# Patient Record
Sex: MALE | Race: WHITE | Employment: FULL TIME | URBAN - METROPOLITAN AREA
[De-identification: names, ages, dates, MRNs, and addresses within clinical notes are randomized per-mention and may not be internally consistent; named-entity substitution may affect disease eponyms.]

---

## 2018-08-01 NOTE — PROGRESS NOTES
SUBJECTIVE:   Adrian Wagner is a 57 year old male who presents to clinic today for the following health issues:      Physical   Annual:     Getting at least 3 servings of Calcium per day:  NO    Bi-annual eye exam:  NO    Dental care twice a year:  NO    Sleep apnea or symptoms of sleep apnea:  None    Diet:  Diabetic    Frequency of exercise:  4-5 days/week    Duration of exercise:  N/A    Taking medications regularly:  Yes    Medication side effects:  None    Additional concerns today:  No    Diabetes Follow-up      Patient is checking blood sugars: not at all    Diabetic concerns: None and other - left foot pain, numbness and tingling     Symptoms of hypoglycemia (low blood sugar): none     Paresthesias (numbness or burning in feet) or sores: Yes left foot     Date of last diabetic eye exam: 2 years    He has experienced increase difficulty reading from a far.      He saw eye doctor last and had no diabetic changes but needed glasses.     He only eats 2 meals per day    Doesn't remember to check blood sugars regularly, last time he checked it was 140.     He is only on one insulin now, previously on two.  Continues on Metformin    Uses the Gabapentin at night, he doesn't have severe neuropathy and actually pain in left foot is likely from past fracture that causes him persistent pain.  He did complete therapy with ID for his osteomyelitis in his right foot. The foot seems to be doing well.     He does admit he likes donuts and sugar. He eats a bag of Fritos at work for snack but admits he used to eat candy bar as well but cut that out.  Drinks only Diet Pepsi, otherwise water.     Chews tobacco, trying to quit, unsuccessful with Chantix in past, has never used nicotine products.     He has been out of his Diabetes medication for the last 3 days.     Diabetes Management Resources    Hyperlipidemia Follow-Up      Rate your low fat/cholesterol diet?: good    Taking statin?  No    Other lipid  medications/supplements?:  none    Hypertension Follow-up      Outpatient blood pressures are not being checked.    Low Salt Diet: low salt    BP Readings from Last 2 Encounters:   08/03/18 (!) 172/98   12/02/13 118/80     Hemoglobin A1C (%)   Date Value   11/20/2013 13.8 (H)   09/12/2007 7.6 (H)     LDL Cholesterol Calculated (mg/dL)   Date Value   09/12/2007 81   08/18/2006 79     Problem list and histories reviewed & adjusted, as indicated.  Additional history: as documented      Patient Active Problem List   Diagnosis     Tobacco use disorder     Hyperlipidemia LDL goal <100     Hypertension goal BP (blood pressure) < 140/90     Contracture of ankle and foot joint     History of kidney stones     Osteomyelitis of right foot (H)     Hyperpigmented skin lesion     History of foot fracture     Type 2 diabetes mellitus with hyperglycemia, with long-term current use of insulin (H)     Type 2 diabetes mellitus with diabetic polyneuropathy, with long-term current use of insulin (H)     Past Surgical History:   Procedure Laterality Date     AMPUTATION Right 05/09/2017    right foot partial amputation     C REMOVAL OF KIDNEY STONE  04/2005       Social History   Substance Use Topics     Smoking status: Never Smoker     Smokeless tobacco: Current User     Types: Snuff, Chew     Alcohol use Yes      Comment: occassionally, weekend     Family History   Problem Relation Age of Onset     Asthma Paternal Grandfather      C.A.D. Paternal Grandfather 55     several paternal uncles     HEART DISEASE Paternal Grandfather      several  paternal uncles also.     Respiratory Paternal Grandfather      asthma     Diabetes Paternal Grandmother      Hypertension Brother      Cancer Maternal Grandmother      unknown type of cancer     Alzheimer Disease Mother      passed away at age 68     Cardiovascular Father 73     cva     Cerebrovascular Disease Father      Breast Cancer No family hx of      Cancer - colorectal No family hx of       Prostate Cancer No family hx of      Anesthesia Reaction No family hx of      Arthritis No family hx of      Blood Disease No family hx of      Lipids No family hx of      Musculoskeletal Disorder No family hx of      Neurologic Disorder No family hx of      Osteoperosis No family hx of      Thyroid Disease No family hx of          Current Outpatient Prescriptions   Medication Sig Dispense Refill     Blood Glucose Monitoring Suppl (FIFTY50 GLUCOSE METER 2.0) w/Device KIT Dispense meter, test strips, lancets covered by pt ins. E11.65 NIDDM type II, uncontrolled - Test 3 times/day, Reason: Unstable diabetes       gabapentin (NEURONTIN) 300 MG capsule Take 1 capsule (300 mg) by mouth At Bedtime 90 capsule 1     insulin glargine (LANTUS SOLOSTAR) 100 UNIT/ML pen Inject 15 Units Subcutaneous every morning 3 mL 1     lisinopril (PRINIVIL/ZESTRIL) 20 MG tablet Take 1 tablet (20 mg) by mouth daily 90 tablet 1     lisinopril-hydrochlorothiazide (PRINZIDE/ZESTORETIC) 20-25 MG per tablet Take 1 tablet by mouth daily 90 tablet 1     metFORMIN (GLUCOPHAGE) 1000 MG tablet Take 1 tablet (1,000 mg) by mouth 2 times daily (with meals) 180 tablet 1     rosuvastatin (CRESTOR) 20 MG tablet Take 1 tablet (20 mg) by mouth At Bedtime 90 tablet 1     ASPIRIN 81 MG OR TABS 1 tablet daily (Patient not taking: No sig reported) 30 12     GARLIC 1 tabalet every day for sinus issues       Multiple Vitamins-Minerals (MULTI-VITAMIN/MINERALS) TABS Take 1 tablet by mouth       [DISCONTINUED] insulin glargine (LANTUS SOLOSTAR) 100 UNIT/ML pen Inject 15 Units Subcutaneous every morning       [DISCONTINUED] LANTUS SOLOSTAR SOLN 100 UNIT/ML 50 units bid. Inject subcutaneous. 1 Month 0     [DISCONTINUED] lisinopril (PRINIVIL,ZESTRIL) 30 MG tablet Take by mouth daily       [DISCONTINUED] lisinopril-hydrochlorothiazide (PRINZIDE/ZESTORETIC) 20-25 MG per tablet        [DISCONTINUED] metFORMIN (GLUCOPHAGE) 1000 MG tablet Take 1,000 mg by mouth 2 times daily  "(with meals)       [DISCONTINUED] metFORMIN (GLUCOPHAGE-XR) 500 MG 24 hr tablet Take 1 tablet (500 mg) by mouth 2 times daily (with meals) (Patient not taking: Reported on 8/3/2018) 60 tablet 3     [DISCONTINUED] rosuvastatin (CRESTOR) 20 MG tablet Take 20 mg by mouth       Allergies   Allergen Reactions     Slo-Niacin [Niacin] Hives     BP Readings from Last 3 Encounters:   08/03/18 (!) 172/98   12/02/13 118/80   11/20/13 128/80    Wt Readings from Last 3 Encounters:   08/03/18 221 lb (100.2 kg)   12/02/13 241 lb (109.3 kg)   11/20/13 240 lb 12.8 oz (109.2 kg)                  Labs reviewed in EPIC    ROS:  Constitutional, HEENT, cardiovascular, pulmonary, GI, , musculoskeletal, neuro, skin, endocrine and psych systems are negative, except as otherwise noted.    Answers for HPI/ROS submitted by the patient on 8/3/2018   abdominal pain: No  Blood in stool: No  Blood in urine: No  chest pain: No  chills: No  congestion: No  constipation: No  cough: No  diarrhea: No  dizziness: No  ear pain: No  eye pain: No  nervous/anxious: No  fever: No  frequency: No  genital sores: No  headaches: No  hearing loss: No  heartburn: No  arthralgias: Yes  joint swelling: No  peripheral edema: No  mood changes: No  myalgias: No  nausea: No  dysuria: No  palpitations: No  Skin sensation changes: No  sore throat: No  urgency: No  rash: No  shortness of breath: No  visual disturbance: Yes  weakness: No  impotence: Yes  penile discharge: No    OBJECTIVE:     BP (!) 172/98  Pulse 68  Temp 96.7  F (35.9  C)  Resp 16  Ht 5' 10.47\" (1.79 m)  Wt 221 lb (100.2 kg)  SpO2 98%  BMI 31.29 kg/m2  Body mass index is 31.29 kg/(m^2).  GENERAL: healthy, alert and no distress  EYES: Eyes grossly normal to inspection, PERRL and conjunctivae and sclerae normal  HENT: ear canals and TM's normal, nose and mouth without ulcers or lesions  NECK: no adenopathy, no asymmetry, masses, or scars and thyroid normal to palpation  RESP: lungs clear to " auscultation - no rales, rhonchi or wheezes  CV: regular rate and rhythm, normal S1 S2, no S3 or S4, no murmur, click or rub, no peripheral edema and peripheral pulses strong  ABDOMEN: soft, nontender, no hepatosplenomegaly, no masses and bowel sounds normal   (male): normal male genitalia without lesions or urethral discharge, no hernia  RECTAL: normal sphincter tone, no rectal masses, prostate normal size, smooth, nontender without nodules or masses  MS: no gross musculoskeletal defects noted, no edema  SKIN: Left side mid back there is a larger circular hyperpigmented, multi-colored lesion.   NEURO: Normal strength and tone, mentation intact and speech normal  PSYCH: mentation appears normal, affect normal/bright  Diabetic Foot Screen:  Any complaints of increased pain or numbness ? No  Is there a foot ulcer now or a history of foot ulcer? No  Does the foot have an abnormal shape?  YES - Post-surg right foot.  Are the nails thick, too long or ingrown? No  Are there any redness or open areas? No         Sensation Testing done at all points on the diagram with monofilament     Right Foot: Sensation Absent at the following points , 1, 2, 3, 4, 5 and 6  Left Foot: Sensation Absent at the following points , 1, 2, 3, 4, 5 and 6     Risk Category: 1- Loss of protective sensation with normal appearing foot (no weakness, deformity, callous, pre-ulcer or h/o ulceration)  Performed by Dorie Potter PA-C     Diagnostic Test Results:  Results for orders placed or performed in visit on 08/03/18 (from the past 24 hour(s))   HEMOGLOBIN A1C   Result Value Ref Range    Hemoglobin A1C 10.2 (H) 0 - 5.6 %       ASSESSMENT/PLAN:       ICD-10-CM    1. Annual physical exam Z00.00    2. Type 2 diabetes mellitus with hyperglycemia, with long-term current use of insulin (H) E11.65 HEMOGLOBIN A1C    Z79.4 Albumin Random Urine Quantitative with Creat Ratio     TSH WITH FREE T4 REFLEX     OPTOMETRY REFERRAL     Comprehensive metabolic  panel     metFORMIN (GLUCOPHAGE) 1000 MG tablet     insulin glargine (LANTUS SOLOSTAR) 100 UNIT/ML pen   3. Type 2 diabetes mellitus with diabetic polyneuropathy, with long-term current use of insulin (H) E11.42 HEMOGLOBIN A1C    Z79.4 Albumin Random Urine Quantitative with Creat Ratio     TSH WITH FREE T4 REFLEX     Comprehensive metabolic panel     metFORMIN (GLUCOPHAGE) 1000 MG tablet     insulin glargine (LANTUS SOLOSTAR) 100 UNIT/ML pen     gabapentin (NEURONTIN) 300 MG capsule   4. Hyperlipidemia LDL goal <100 E78.5 Lipid panel reflex to direct LDL Fasting     Comprehensive metabolic panel     rosuvastatin (CRESTOR) 20 MG tablet   5. Hypertension goal BP (blood pressure) < 140/90 I10 Comprehensive metabolic panel     lisinopril-hydrochlorothiazide (PRINZIDE/ZESTORETIC) 20-25 MG per tablet     lisinopril (PRINIVIL/ZESTRIL) 20 MG tablet     **Basic metabolic panel FUTURE 14d   6. Tobacco use disorder F17.200 TOBACCO CESSATION ORDER FOR    7. Hyperpigmented skin lesion L81.9    8. Need for prophylactic vaccination against Streptococcus pneumoniae (pneumococcus) Z23 PPSV23, IM/SUBQ (2+ YRS) - [id number]   9. Screening for diabetic peripheral neuropathy Z13.89 FOOT EXAM  NO CHARGE [58312.114]   10. Need for hepatitis C screening test Z11.59 Hepatitis C Screen Reflex to HCV RNA Quant and Genotype   11. Screening for HIV (human immunodeficiency virus) Z11.4 HIV Screening   12. Screen for colon cancer Z12.11 GASTROENTEROLOGY ADULT REF PROCEDURE ONLY   13. History of foot fracture Z87.81 ORTHO  REFERRAL       1. We will call with labs and make appropriate adjustments/recommendations as it pertains to his other chronic conditions    2/3: His A1c is elevated from his last numbers at his primary at [facility].  Last A1c at [facility] was 8.6 on 11/28/2017.  His A1c is now 10.2.  His microalbumin is worse and his blood sugar reading is in the 300's.  He is metformin max dose and will continue.  He reports only  Using the Lantus and not mealtime insulin.  He is only using 15 units.  At this time recommend that he increase this to 20 units once daily.  Checking blood sugars 3 times per day.  Monitoring for any lows.  Encourage him to bring in monitor at next visit to see reading results.  May or may not require mealtime insulin in the future (patient only eats 2 meals per day).  He doesn't feel he needs diabetes education at this time.     4: Recommend he restart his Crestor that he doesn't ever recall starting even if he has a normal LDL due to his diabetes.  Monitor for side effects. His Triglycerides are elevated, work on diet, restart Crestor, re-check in 3 months.     5. Hypertension: BP not well controlled, patient currently on medication.  Advised diet and exercise recommendations and increased Lisinopril to 40 mg total per day, continue with hydrochlorothiazide at 25 mg once daily.  Will re-check BMP and BP within 2 weeks with nurse/lab only visits.     6. He is working to quit slowly.  He chews but has never smoked.  He has failed chantix but discussed this doesn't generally help as much as it does for those who smoke.  Recommend use of nicotine replacement.  He is ok for now, will call if he needs these sent to pharmacy.     7. There is an abnormal lesion on back, will remove at follow-up visit.      Updated his immunizations, he declines influenza because he gets ill but is ok with Pneumovax.  Ordered Colonoscopy and he is going to work with his son to set up a date that will work for him to get that done.      His foot pain from past fracture still bothers him and recommended he see Podiatry.  Referral placed today.     Follow-up in 1 month, sooner if needed.      Options for treatment and follow-up care were reviewed with the patient and/or guardian. Patient and/or guardian engaged in the decision making process and verbalized understanding of the options discussed and agreed with the final plan.     Dorie JJ  LUH Potter  [facility]    Answers for HPI/ROS submitted by the patient on 8/3/2018   abdominal pain: No  Blood in stool: No  Blood in urine: No  chest pain: No  chills: No  congestion: No  constipation: No  cough: No  diarrhea: No  dizziness: No  ear pain: No  eye pain: No  nervous/anxious: No  fever: No  frequency: No  genital sores: No  headaches: No  hearing loss: No  heartburn: No  arthralgias: Yes  joint swelling: No  peripheral edema: No  mood changes: No  myalgias: No  nausea: No  dysuria: No  palpitations: No  Skin sensation changes: No  sore throat: No  urgency: No  rash: No  shortness of breath: No  visual disturbance: Yes  weakness: No  impotence: Yes  penile discharge: No  PHQ-2 Score: 0

## 2018-08-01 NOTE — PATIENT INSTRUCTIONS
1. We will call with labs and make recommendations for your blood pressure, cholesterol and diabetes.  This will also determine your next Diabetes visit.     2. Set up with Nurse Only and Lab only visits to re-check your blood pressure and kidney function in 2 weeks.      3. Start taking Lisinopril 20 mg dose along with your previous blood pressure doses    4. Continue to watch carbs, sugars, salt, fat intake.     5. Start taking your statin (crestor) at bedtime for your cholesterol.     6. Set up with Eye Doctor, see number provided for [facility]     7. Set up with DR. Eid our Podiatrist regarding your left foot pain.     8. We should perform biopsy on that spot on back in future    9. Let me know if you want nicotine replacement products to help with chew.     Preventive Health Recommendations  Male Ages 50 - 64    Yearly exam:             See your health care provider every year in order to  o   Review health changes.   o   Discuss preventive care.    o   Review your medicines if your doctor has prescribed any.     Have a cholesterol test every 5 years, or more frequently if you are at risk for high cholesterol/heart disease.     Have a diabetes test (fasting glucose) every three years. If you are at risk for diabetes, you should have this test more often.     Have a colonoscopy at age 50, or have a yearly FIT test (stool test). These exams will check for colon cancer.      Talk with your health care provider about whether or not a prostate cancer screening test (PSA) is right for you.    You should be tested each year for STDs (sexually transmitted diseases), if you re at risk.     Shots: Get a flu shot each year. Get a tetanus shot every 10 years.     Nutrition:    Eat at least 5 servings of fruits and vegetables daily.     Eat whole-grain bread, whole-wheat pasta and brown rice instead of white grains and rice.     Get adequate Calcium and Vitamin D.     Lifestyle    Exercise for at least 150 minutes a week  (30 minutes a day, 5 days a week). This will help you control your weight and prevent disease.     Limit alcohol to one drink per day.     No smoking.     Wear sunscreen to prevent skin cancer.     See your dentist every six months for an exam and cleaning.     See your eye doctor every 1 to 2 years.

## 2018-08-03 ENCOUNTER — OFFICE VISIT (OUTPATIENT)
Dept: FAMILY MEDICINE | Facility: OTHER | Age: 58
End: 2018-08-03
Payer: COMMERCIAL

## 2018-08-03 VITALS
SYSTOLIC BLOOD PRESSURE: 172 MMHG | HEART RATE: 68 BPM | DIASTOLIC BLOOD PRESSURE: 98 MMHG | OXYGEN SATURATION: 98 % | RESPIRATION RATE: 16 BRPM | WEIGHT: 221 LBS | BODY MASS INDEX: 31.64 KG/M2 | HEIGHT: 70 IN | TEMPERATURE: 96.7 F

## 2018-08-03 DIAGNOSIS — L81.9 HYPERPIGMENTED SKIN LESION: ICD-10-CM

## 2018-08-03 DIAGNOSIS — Z23 NEED FOR PROPHYLACTIC VACCINATION AGAINST STREPTOCOCCUS PNEUMONIAE (PNEUMOCOCCUS): ICD-10-CM

## 2018-08-03 DIAGNOSIS — E11.42 TYPE 2 DIABETES MELLITUS WITH DIABETIC POLYNEUROPATHY, WITH LONG-TERM CURRENT USE OF INSULIN (H): ICD-10-CM

## 2018-08-03 DIAGNOSIS — Z79.4 TYPE 2 DIABETES MELLITUS WITH HYPERGLYCEMIA, WITH LONG-TERM CURRENT USE OF INSULIN (H): ICD-10-CM

## 2018-08-03 DIAGNOSIS — E11.65 TYPE 2 DIABETES MELLITUS WITH HYPERGLYCEMIA, WITH LONG-TERM CURRENT USE OF INSULIN (H): ICD-10-CM

## 2018-08-03 DIAGNOSIS — Z12.11 SCREEN FOR COLON CANCER: ICD-10-CM

## 2018-08-03 DIAGNOSIS — I10 HYPERTENSION GOAL BP (BLOOD PRESSURE) < 140/90: ICD-10-CM

## 2018-08-03 DIAGNOSIS — Z79.4 TYPE 2 DIABETES MELLITUS WITH DIABETIC POLYNEUROPATHY, WITH LONG-TERM CURRENT USE OF INSULIN (H): ICD-10-CM

## 2018-08-03 DIAGNOSIS — F17.200 TOBACCO USE DISORDER: ICD-10-CM

## 2018-08-03 DIAGNOSIS — Z11.4 SCREENING FOR HIV (HUMAN IMMUNODEFICIENCY VIRUS): ICD-10-CM

## 2018-08-03 DIAGNOSIS — Z87.81 HISTORY OF FOOT FRACTURE: ICD-10-CM

## 2018-08-03 DIAGNOSIS — Z11.59 NEED FOR HEPATITIS C SCREENING TEST: ICD-10-CM

## 2018-08-03 DIAGNOSIS — Z13.89 SCREENING FOR DIABETIC PERIPHERAL NEUROPATHY: ICD-10-CM

## 2018-08-03 DIAGNOSIS — Z00.00 ANNUAL PHYSICAL EXAM: Primary | ICD-10-CM

## 2018-08-03 DIAGNOSIS — E78.5 HYPERLIPIDEMIA LDL GOAL <100: ICD-10-CM

## 2018-08-03 PROBLEM — M24.573 CONTRACTURE OF ANKLE AND FOOT JOINT: Status: ACTIVE | Noted: 2018-08-03

## 2018-08-03 PROBLEM — M24.576 CONTRACTURE OF ANKLE AND FOOT JOINT: Status: ACTIVE | Noted: 2018-08-03

## 2018-08-03 PROBLEM — E11.40 DIABETIC NEUROPATHY (H): Status: ACTIVE | Noted: 2017-05-17

## 2018-08-03 PROBLEM — Z87.442 HISTORY OF KIDNEY STONES: Status: ACTIVE | Noted: 2017-05-01

## 2018-08-03 PROBLEM — E11.40 DIABETIC NEUROPATHY (H): Status: RESOLVED | Noted: 2017-05-17 | Resolved: 2018-08-03

## 2018-08-03 PROBLEM — M86.9 OSTEOMYELITIS OF RIGHT FOOT (H): Status: ACTIVE | Noted: 2017-05-01

## 2018-08-03 LAB
ALBUMIN SERPL-MCNC: 4.1 G/DL (ref 3.4–5)
ALP SERPL-CCNC: 61 U/L (ref 40–150)
ALT SERPL W P-5'-P-CCNC: 35 U/L (ref 0–70)
ANION GAP SERPL CALCULATED.3IONS-SCNC: 12 MMOL/L (ref 3–14)
AST SERPL W P-5'-P-CCNC: 20 U/L (ref 0–45)
BILIRUB SERPL-MCNC: 0.6 MG/DL (ref 0.2–1.3)
BUN SERPL-MCNC: 17 MG/DL (ref 7–30)
CALCIUM SERPL-MCNC: 9.3 MG/DL (ref 8.5–10.1)
CHLORIDE SERPL-SCNC: 96 MMOL/L (ref 94–109)
CHOLEST SERPL-MCNC: 185 MG/DL
CO2 SERPL-SCNC: 28 MMOL/L (ref 20–32)
CREAT SERPL-MCNC: 0.84 MG/DL (ref 0.66–1.25)
CREAT UR-MCNC: 17 MG/DL
GFR SERPL CREATININE-BSD FRML MDRD: >90 ML/MIN/1.7M2
GLUCOSE SERPL-MCNC: 374 MG/DL (ref 70–99)
HBA1C MFR BLD: 10.2 % (ref 0–5.6)
HDLC SERPL-MCNC: 34 MG/DL
LDLC SERPL CALC-MCNC: 84 MG/DL
MICROALBUMIN UR-MCNC: 288 MG/L
MICROALBUMIN/CREAT UR: 1734.94 MG/G CR (ref 0–17)
NONHDLC SERPL-MCNC: 151 MG/DL
POTASSIUM SERPL-SCNC: 3.4 MMOL/L (ref 3.4–5.3)
PROT SERPL-MCNC: 8 G/DL (ref 6.8–8.8)
SODIUM SERPL-SCNC: 136 MMOL/L (ref 133–144)
TRIGL SERPL-MCNC: 336 MG/DL
TSH SERPL DL<=0.005 MIU/L-ACNC: 2.4 MU/L (ref 0.4–4)

## 2018-08-03 PROCEDURE — 99207 C FOOT EXAM  NO CHARGE: CPT | Mod: 25 | Performed by: PHYSICIAN ASSISTANT

## 2018-08-03 PROCEDURE — 83036 HEMOGLOBIN GLYCOSYLATED A1C: CPT | Performed by: PHYSICIAN ASSISTANT

## 2018-08-03 PROCEDURE — 87389 HIV-1 AG W/HIV-1&-2 AB AG IA: CPT | Performed by: PHYSICIAN ASSISTANT

## 2018-08-03 PROCEDURE — 99386 PREV VISIT NEW AGE 40-64: CPT | Mod: 25 | Performed by: PHYSICIAN ASSISTANT

## 2018-08-03 PROCEDURE — 86803 HEPATITIS C AB TEST: CPT | Performed by: PHYSICIAN ASSISTANT

## 2018-08-03 PROCEDURE — 99214 OFFICE O/P EST MOD 30 MIN: CPT | Mod: 25 | Performed by: PHYSICIAN ASSISTANT

## 2018-08-03 PROCEDURE — 84443 ASSAY THYROID STIM HORMONE: CPT | Performed by: PHYSICIAN ASSISTANT

## 2018-08-03 PROCEDURE — 90732 PPSV23 VACC 2 YRS+ SUBQ/IM: CPT | Performed by: PHYSICIAN ASSISTANT

## 2018-08-03 PROCEDURE — 82043 UR ALBUMIN QUANTITATIVE: CPT | Performed by: PHYSICIAN ASSISTANT

## 2018-08-03 PROCEDURE — 36415 COLL VENOUS BLD VENIPUNCTURE: CPT | Performed by: PHYSICIAN ASSISTANT

## 2018-08-03 PROCEDURE — 80053 COMPREHEN METABOLIC PANEL: CPT | Performed by: PHYSICIAN ASSISTANT

## 2018-08-03 PROCEDURE — 80061 LIPID PANEL: CPT | Performed by: PHYSICIAN ASSISTANT

## 2018-08-03 PROCEDURE — 90471 IMMUNIZATION ADMIN: CPT | Performed by: PHYSICIAN ASSISTANT

## 2018-08-03 RX ORDER — ROSUVASTATIN CALCIUM 20 MG/1
20 TABLET, COATED ORAL
COMMUNITY
Start: 2017-11-28 | End: 2018-08-03

## 2018-08-03 RX ORDER — GABAPENTIN 300 MG/1
300 CAPSULE ORAL AT BEDTIME
Qty: 90 CAPSULE | Refills: 1 | Status: SHIPPED | OUTPATIENT
Start: 2018-08-03 | End: 2019-01-16

## 2018-08-03 RX ORDER — LISINOPRIL AND HYDROCHLOROTHIAZIDE 20; 25 MG/1; MG/1
TABLET ORAL
COMMUNITY
Start: 2018-07-22 | End: 2018-08-03

## 2018-08-03 RX ORDER — INSULIN PUMP SYRINGE, 3 ML
EACH MISCELLANEOUS
COMMUNITY
Start: 2016-12-13

## 2018-08-03 RX ORDER — MULTIVIT-MIN/IRON FUM/FOLIC AC 7.5 MG-4
1 TABLET ORAL
COMMUNITY

## 2018-08-03 RX ORDER — ROSUVASTATIN CALCIUM 20 MG/1
20 TABLET, COATED ORAL AT BEDTIME
Qty: 90 TABLET | Refills: 1 | Status: SHIPPED | OUTPATIENT
Start: 2018-08-03 | End: 2019-01-16

## 2018-08-03 RX ORDER — LISINOPRIL AND HYDROCHLOROTHIAZIDE 20; 25 MG/1; MG/1
1 TABLET ORAL DAILY
Qty: 90 TABLET | Refills: 1 | Status: SHIPPED | OUTPATIENT
Start: 2018-08-03 | End: 2019-01-10

## 2018-08-03 RX ORDER — LISINOPRIL 20 MG/1
20 TABLET ORAL DAILY
Qty: 90 TABLET | Refills: 1 | Status: SHIPPED | OUTPATIENT
Start: 2018-08-03 | End: 2019-01-16

## 2018-08-03 ASSESSMENT — ENCOUNTER SYMPTOMS
HEMATOCHEZIA: 0
DIARRHEA: 0
CHILLS: 0
HEMATURIA: 0
SHORTNESS OF BREATH: 0
NERVOUS/ANXIOUS: 0
WEAKNESS: 0
HEARTBURN: 0
HEADACHES: 0
ARTHRALGIAS: 1
FEVER: 0
FREQUENCY: 0
EYE PAIN: 0
DIZZINESS: 0
PALPITATIONS: 0
MYALGIAS: 0
COUGH: 0
JOINT SWELLING: 0
PARESTHESIAS: 0
CONSTIPATION: 0
SORE THROAT: 0
NAUSEA: 0
ABDOMINAL PAIN: 0
DYSURIA: 0

## 2018-08-03 ASSESSMENT — PAIN SCALES - GENERAL: PAINLEVEL: NO PAIN (0)

## 2018-08-03 NOTE — Clinical Note
Not sure if I should be adding on an office visit because of the diabetes management or not. He is also a new patient now I believe because it has been so long since we saw him last.

## 2018-08-03 NOTE — NURSING NOTE
Prior to injection verified patient identity using patient's name and date of birth.  Due to injection administration, patient instructed to remain in clinic for 15 minutes  afterwards, and to report any adverse reaction to me immediately.      Screening Questionnaire for Adult Immunization    Are you sick today?   No   Do you have allergies to medications, food, a vaccine component or latex?   No   Have you ever had a serious reaction after receiving a vaccination?   No   Do you have a long-term health problem with heart disease, lung disease, asthma, kidney disease, metabolic disease (e.g. diabetes), anemia, or other blood disorder?   No   Do you have cancer, leukemia, HIV/AIDS, or any other immune system problem?   No   In the past 3 months, have you taken medications that affect  your immune system, such as prednisone, other steroids, or anticancer drugs; drugs for the treatment of rheumatoid arthritis, Crohn s disease, or psoriasis; or have you had radiation treatments?   No   Have you had a seizure, or a brain or other nervous system problem?   No   During the past year, have you received a transfusion of blood or blood     products, or been given immune (gamma) globulin or antiviral drug?   No   For women: Are you pregnant or is there a chance you could become        pregnant during the next month?   No   Have you received any vaccinations in the past 4 weeks?   No     Immunization questionnaire answers were all negative.        Per orders of Dorie Potter PA-C, injection of Pneumococcal 23 given by Belgica Ray CMA. Patient instructed to remain in clinic for 15 minutes afterwards, and to report any adverse reaction to me immediately.       Screening performed by Belgica Ray on 8/3/2018 at 12:26 PM.

## 2018-08-03 NOTE — MR AVS SNAPSHOT
After Visit Summary   8/3/2018    Adrian Wagner    MRN: [id number]           Patient Information     Date Of Birth          1960        Visit Information        Provider Department      8/3/2018 10:40 AM Dorie Potter PA-C [facility]        Today's Diagnoses     Annual physical exam    -  1    Type 2 diabetes mellitus with hyperglycemia, with long-term current use of insulin (H)        Type 2 diabetes mellitus with diabetic polyneuropathy, with long-term current use of insulin (H)        Hyperlipidemia LDL goal <100        Hypertension goal BP (blood pressure) < 140/90        Tobacco use disorder        Hyperpigmented skin lesion        Need for prophylactic vaccination against Streptococcus pneumoniae (pneumococcus)        Screening for diabetic peripheral neuropathy        Need for hepatitis C screening test        Screening for HIV (human immunodeficiency virus)        Screen for colon cancer        History of foot fracture          Care Instructions    1. We will call with labs and make recommendations for your blood pressure, cholesterol and diabetes.  This will also determine your next Diabetes visit.     2. Set up with Nurse Only and Lab only visits to re-check your blood pressure and kidney function in 2 weeks.      3. Start taking Lisinopril 20 mg dose along with your previous blood pressure doses    4. Continue to watch carbs, sugars, salt, fat intake.     5. Start taking your statin (crestor) at bedtime for your cholesterol.     6. Set up with Eye Doctor, see number provided for [facility]     7. Set up with DR. Eid our Podiatrist regarding your left foot pain.     8. We should perform biopsy on that spot on back in future    9. Let me know if you want nicotine replacement products to help with chew.     Preventive Health Recommendations  Male Ages 50 - 64    Yearly exam:             See your health care provider every year in order to  o   Review health changes.   o    Discuss preventive care.    o   Review your medicines if your doctor has prescribed any.     Have a cholesterol test every 5 years, or more frequently if you are at risk for high cholesterol/heart disease.     Have a diabetes test (fasting glucose) every three years. If you are at risk for diabetes, you should have this test more often.     Have a colonoscopy at age 50, or have a yearly FIT test (stool test). These exams will check for colon cancer.      Talk with your health care provider about whether or not a prostate cancer screening test (PSA) is right for you.    You should be tested each year for STDs (sexually transmitted diseases), if you re at risk.     Shots: Get a flu shot each year. Get a tetanus shot every 10 years.     Nutrition:    Eat at least 5 servings of fruits and vegetables daily.     Eat whole-grain bread, whole-wheat pasta and brown rice instead of white grains and rice.     Get adequate Calcium and Vitamin D.     Lifestyle    Exercise for at least 150 minutes a week (30 minutes a day, 5 days a week). This will help you control your weight and prevent disease.     Limit alcohol to one drink per day.     No smoking.     Wear sunscreen to prevent skin cancer.     See your dentist every six months for an exam and cleaning.     See your eye doctor every 1 to 2 years.            Follow-ups after your visit        Additional Services     GASTROENTEROLOGY ADULT REF PROCEDURE ONLY       Last Lab Result: Creatinine (mg/dL)       Date                     Value                 11/20/2013               0.83             ----------  There is no height or weight on file to calculate BMI.     Needed:  No  Language:  English    Patient will be contacted to schedule procedure.     Please be aware that coverage of these services is subject to the terms and limitations of your health insurance plan.  Call member services at your health plan with any benefit or coverage questions.  Any procedures must  be performed at a [facility] facility OR coordinated by your clinic's referral office.    Please bring the following with you to your appointment:    (1) Any X-Rays, CTs or MRIs which have been performed.  Contact the facility where they were done to arrange for  prior to your scheduled appointment.    (2) List of current medications   (3) This referral request   (4) Any documents/labs given to you for this referral            OPTOMETRY REFERRAL       Your provider has referred you to:  [facility] [place] [phone]   [email or web address]    Please be aware that coverage of these services is subject to the terms and limitations of your health insurance plan.  Call member services at your health plan with any benefit or coverage questions.      Please bring the following to your appointment:  >>   Any x-rays, CTs or MRIs which have been performed.  Contact the facility where they were done to arrange for  prior to your scheduled appointment.  Any new CT, MRI or other procedures ordered by your specialist must be performed at a [facility] facility or coordinated by your clinic's referral office.    >>   List of current medications   >>   This referral request   >>   Any documents/labs given to you for this referral            ORTHO  REFERRAL       [facility] is referring you to the Orthopedic  Services at [facility].       The  Representative will assist you in the coordination of your Orthopedic and Musculoskeletal Care as prescribed by your physician.    The  Representative will call you within 1 business day to help schedule your appointment, or you may contact the  Representative at:    All areas ~ [phone]     Type of Referral : [facility]       Timeframe requested: Routine    Coverage of these services is subject to the terms and limitations of your  health insurance plan.  Please call member services at your health plan with any benefit or coverage questions.      If X-rays, CT or MRI's have been performed, please contact the facility where they were done to arrange for , prior to your scheduled appointment.  Please bring this referral request to your appointment and present it to your specialist.                  Follow-up notes from your care team     Return in about 2 weeks (around 8/17/2018) for BP Recheck.      Your next 10 appointments already scheduled     Aug 17, 2018 11:00 AM CDT   Nurse Only with NL LAURA TEAM B, [facility]    [address]  [facility] [address]   [phone]              Future tests that were ordered for you today     Open Future Orders        Priority Expected Expires Ordered    **Basic metabolic panel FUTURE 14d Routine 8/10/2018 8/17/2018 8/3/2018            Who to contact     If you have questions or need follow up information about today's clinic visit or your schedule please contact [facility] directly at [phone].  Normal or non-critical lab and imaging results will be communicated to you by MyChart, letter or phone within 4 business days after the clinic has received the results. If you do not hear from us within 7 days, please contact the clinic through MyChart or phone. If you have a critical or abnormal lab result, we will notify you by phone as soon as possible.  Submit refill requests through [employer] or call your pharmacy and they will forward the refill request to us. Please allow 3 business days for your refill to be completed.          Additional Information About Your Visit        Care EveryWhere ID     This is your Care EveryWhere ID. This could be used by other organizations to access your [facility] medical records  [id number]        Your Vitals Were     Pulse Temperature Respirations Height Pulse Oximetry BMI (Body Mass  "Index)    68 96.7  F (35.9  C) 16 5' 10.47\" (1.79 m) 98% 31.29 kg/m2       Blood Pressure from Last 3 Encounters:   08/03/18 (!) 172/98   12/02/13 118/80   11/20/13 128/80    Weight from Last 3 Encounters:   08/03/18 221 lb (100.2 kg)   12/02/13 241 lb (109.3 kg)   11/20/13 240 lb 12.8 oz (109.2 kg)              We Performed the Following     Albumin Random Urine Quantitative with Creat Ratio     Comprehensive metabolic panel     FOOT EXAM  NO CHARGE [[id number]]     GASTROENTEROLOGY ADULT REF PROCEDURE ONLY     HEMOGLOBIN A1C     Hepatitis C Screen Reflex to HCV RNA Quant and Genotype     HIV Screening     Lipid panel reflex to direct LDL Fasting     OPTOMETRY REFERRAL     ORTHO  REFERRAL     PPSV23, IM/SUBQ (2+ YRS) - [id number]     TOBACCO CESSATION ORDER FOR HM     TSH WITH FREE T4 REFLEX          Today's Medication Changes          These changes are accurate as of 8/3/18 11:59 PM.  If you have any questions, ask your nurse or doctor.               Start taking these medicines.        Dose/Directions    gabapentin 300 MG capsule   Commonly known as:  NEURONTIN   Used for:  Type 2 diabetes mellitus with diabetic polyneuropathy, with long-term current use of insulin (H)   Started by:  Dorie Potter PA-C        Dose:  300 mg   Take 1 capsule (300 mg) by mouth At Bedtime   Quantity:  90 capsule   Refills:  1       insulin glargine 100 UNIT/ML injection   Commonly known as:  LANTUS   Used for:  Type 2 diabetes mellitus with hyperglycemia, with long-term current use of insulin (H), Type 2 diabetes mellitus with diabetic polyneuropathy, with long-term current use of insulin (H)   Replaces:  LANTUS SOLOSTAR 100 UNIT/ML injection   Started by:  Dorie Potter PA-C        Dose:  20 Units   Inject 20 Units Subcutaneous every morning   Quantity:  3 mL   Refills:  1       insulin pen needle 31G X 5 MM   Commonly known as:  B-D U/F   Used for:  Type 2 diabetes mellitus with hyperglycemia, with long-term current use " of insulin (H)   Started by:  Dorie Potter PA-C        Use daily or as directed.   Quantity:  100 each   Refills:  prn         These medicines have changed or have updated prescriptions.        Dose/Directions    FIFTY50 GLUCOSE METER 2.0 w/Device Kit   This may have changed:  Another medication with the same name was removed. Continue taking this medication, and follow the directions you see here.   Changed by:  Dorie Potter PA-C        Dispense meter, test strips, lancets covered by pt ins. E11.65 NIDDM type II, uncontrolled - Test 3 times/day, Reason: Unstable diabetes   Refills:  0       lisinopril 20 MG tablet   Commonly known as:  PRINIVIL/ZESTRIL   This may have changed:    - medication strength  - how much to take   Used for:  Hypertension goal BP (blood pressure) < 140/90   Changed by:  Dorie Potter PA-C        Dose:  20 mg   Take 1 tablet (20 mg) by mouth daily   Quantity:  90 tablet   Refills:  1       lisinopril-hydrochlorothiazide 20-25 MG per tablet   Commonly known as:  PRINZIDE/ZESTORETIC   This may have changed:    - how much to take  - how to take this  - when to take this   Used for:  Hypertension goal BP (blood pressure) < 140/90   Changed by:  Dorie Potter PA-C        Dose:  1 tablet   Take 1 tablet by mouth daily   Quantity:  90 tablet   Refills:  1       metFORMIN 1000 MG tablet   Commonly known as:  GLUCOPHAGE   This may have changed:  Another medication with the same name was removed. Continue taking this medication, and follow the directions you see here.   Used for:  Type 2 diabetes mellitus with hyperglycemia, with long-term current use of insulin (H), Type 2 diabetes mellitus with diabetic polyneuropathy, with long-term current use of insulin (H)   Changed by:  Dorie Potter PA-C        Dose:  1000 mg   Take 1 tablet (1,000 mg) by mouth 2 times daily (with meals)   Quantity:  180 tablet   Refills:  1       rosuvastatin 20 MG tablet   Commonly known as:  CRESTOR   This may have  changed:  when to take this   Used for:  Hyperlipidemia LDL goal <100   Changed by:  Dorie Potter PA-C        Dose:  20 mg   Take 1 tablet (20 mg) by mouth At Bedtime   Quantity:  90 tablet   Refills:  1         Stop taking these medicines if you haven't already. Please contact your care team if you have questions.     blood glucose monitoring lancets   Stopped by:  Dorie Potter PA-C           blood glucose monitoring test strip   Commonly known as:  ACCU-CHEK BART   Stopped by:  Dorie Potter PA-C           hydrochlorothiazide 50 MG tablet   Commonly known as:  HYDRODIURIL   Stopped by:  Dorie Potter PA-C           LANTUS SOLOSTAR 100 UNIT/ML injection   Generic drug:  insulin glargine   Replaced by:  insulin glargine 100 UNIT/ML injection   Stopped by:  Dorie Potter PA-C           NOVOLOG FLEXPEN SC   Stopped by:  Dorie Potter PA-C           NovoLOG MIX 70/30 FLEXpen injection   Generic drug:  insulin aspart protamine-insulin aspart   Stopped by:  Dorie Potter PA-C                Where to get your medicines      These medications were sent to [facility] [address] [place] [address] [facility] [address]  [address] [facility] [address]     Phone:  [phone]     gabapentin 300 MG capsule    insulin pen needle 31G X 5 MM    lisinopril 20 MG tablet    lisinopril-hydrochlorothiazide 20-25 MG per tablet    metFORMIN 1000 MG tablet    rosuvastatin 20 MG tablet         Some of these will need a paper prescription and others can be bought over the counter.  Ask your nurse if you have questions.     You don't need a prescription for these medications     insulin glargine 100 UNIT/ML injection                Primary Care Provider Fax #    Physician No Ref-Primary [phone]       No address on file        Equal Access to Services     SELAM GUTIERREZ AH: Ronen Chino, julita gibson, allison kacullen hamilton, jay obrien.  So [facility] [phone].    ATENCIÓN: Si luz hunter, tiene a rincon disposición servicios gratuitos de asistencia lingüística. Vinnie bae [phone].    We comply with applicable federal civil rights laws and [place] laws. We do not discriminate on the basis of race, color, national origin, age, disability, sex, sexual orientation, or gender identity.            Thank you!     Thank you for choosing [facility]  for your care. Our goal is always to provide you with excellent care. Hearing back from our patients is one way we can continue to improve our services. Please take a few minutes to complete the written survey that you may receive in the mail after your visit with us. Thank you!             Your Updated Medication List - Protect others around you: Learn how to safely use, store and throw away your medicines at www.disposemymeds.org.          This list is accurate as of 8/3/18 11:59 PM.  Always use your most recent med list.                   Brand Name Dispense Instructions for use Diagnosis    aspirin 81 MG tablet     30    1 tablet daily        FIFTY50 GLUCOSE METER 2.0 w/Device Kit      Dispense meter, test strips, lancets covered by pt ins. E11.65 NIDDM type II, uncontrolled - Test 3 times/day, Reason: Unstable diabetes        gabapentin 300 MG capsule    NEURONTIN    90 capsule    Take 1 capsule (300 mg) by mouth At Bedtime    Type 2 diabetes mellitus with diabetic polyneuropathy, with long-term current use of insulin (H)       GARLIC      1 tabalet every day for sinus issues        insulin glargine 100 UNIT/ML injection    LANTUS    3 mL    Inject 20 Units Subcutaneous every morning    Type 2 diabetes mellitus with hyperglycemia, with long-term current use of insulin (H), Type 2 diabetes mellitus with diabetic polyneuropathy, with long-term current use of insulin (H)       insulin pen needle 31G X 5 MM    B-D U/F    100 each    Use daily or as directed.    Type 2 diabetes mellitus with  hyperglycemia, with long-term current use of insulin (H)       lisinopril 20 MG tablet    PRINIVIL/ZESTRIL    90 tablet    Take 1 tablet (20 mg) by mouth daily    Hypertension goal BP (blood pressure) < 140/90       lisinopril-hydrochlorothiazide 20-25 MG per tablet    PRINZIDE/ZESTORETIC    90 tablet    Take 1 tablet by mouth daily    Hypertension goal BP (blood pressure) < 140/90       metFORMIN 1000 MG tablet    GLUCOPHAGE    180 tablet    Take 1 tablet (1,000 mg) by mouth 2 times daily (with meals)    Type 2 diabetes mellitus with hyperglycemia, with long-term current use of insulin (H), Type 2 diabetes mellitus with diabetic polyneuropathy, with long-term current use of insulin (H)       MULTI-VITAMIN/MINERALS Tabs      Take 1 tablet by mouth        rosuvastatin 20 MG tablet    CRESTOR    90 tablet    Take 1 tablet (20 mg) by mouth At Bedtime    Hyperlipidemia LDL goal <100

## 2018-08-05 LAB — HCV AB SERPL QL IA: NONREACTIVE

## 2018-08-06 ENCOUNTER — TELEPHONE (OUTPATIENT)
Dept: FAMILY MEDICINE | Facility: OTHER | Age: 58
End: 2018-08-06

## 2018-08-06 LAB — HIV 1+2 AB+HIV1 P24 AG SERPL QL IA: NONREACTIVE

## 2018-08-06 NOTE — TELEPHONE ENCOUNTER
----- Message from Dorie Potter PA-C sent at 8/4/2018  3:16 PM CDT -----  Please call patient.  His labs are not looking the best.  Thyroid, kidney and liver function are normal.  His A1c went from 8 up to 10.2, his blood sugar is 374, his microalbumin protein in urine is worse at 1700 and his triglycerides are high.    1. He needs to increase his insulin to 20 units once daily, work harder on diet modifications.   2. Start the Crestor as discussed for his cholesterol, and work on diet - limiting carbs and fats.   3. He needs to check his blood sugars 3 times per day until his next visit.     Please schedule him for 1 month diabetes follow-up in clinic.  Bring his meter with or his readings written down with.    If he doesn't improve his blood sugar he may need to go back on two different insulins to get this better controlled.      Dorie Potter PA-C

## 2018-08-06 NOTE — TELEPHONE ENCOUNTER
Left message for patient to return call to clinic.  Please inform of message below.    Stephanie Ramos, [facility]  August 6, 2018

## 2018-08-08 NOTE — TELEPHONE ENCOUNTER
LM for patient to return call. Please transfer to the team and give all lab results below.     Skylar Castillo, RN, BSN

## 2018-09-14 ENCOUNTER — TELEPHONE (OUTPATIENT)
Dept: FAMILY MEDICINE | Facility: OTHER | Age: 58
End: 2018-09-14

## 2018-09-14 NOTE — TELEPHONE ENCOUNTER
You placed a referral to [facility] for a diabetic eye exam on 8/3/18.    It is unclear if the patient has scheduled yet, not finding a report showing they were seen.     Please forward to your team if further follow up is needed to see if they have made this appointment.      Thank you!   Cony/Referral Representative for [facility]

## 2018-09-19 ENCOUNTER — TELEPHONE (OUTPATIENT)
Dept: FAMILY MEDICINE | Facility: OTHER | Age: 58
End: 2018-09-19

## 2018-09-19 NOTE — LETTER
[address]   [facility] [address]  Phone: [phone]  September 26, 2018      Adrian Wagner  [address] [facility] [address]      Dear Adrian,    We care about your health and have reviewed your health plan including your medical conditions, medications, and lab results.  Based on this review, it is recommended that you follow up regarding the following health topic(s):  -High Blood Pressure    We recommend you take the following action(s):  -schedule a FREE FLOAT MA-ONLY BLOOD PRESSURE APPOINTMENT within the next 1-4 weeks.     Please call us at the [facility] - [phone] (or use [employer]) to address the above recommendations.     Thank you for trusting [facility] and we appreciate the opportunity to serve you.  We look forward to supporting your healthcare needs in the future.    Healthy Regards,    Your Health Care Team  [facility]

## 2018-09-19 NOTE — TELEPHONE ENCOUNTER
Summary:    Patient is due/failing the following:   BP CHECK    Action needed:   Patient needs nurse only appointment.    Type of outreach:    Phone, left message for patient to call back.     Questions for provider review:    None                                                                                                                                    Nadia Sales       Chart routed to Care Team .          Panel Management Review      Patient has the following on his problem list:     Diabetes    ASA: Passed    Last A1C  Lab Results   Component Value Date    A1C 10.2 08/03/2018    A1C 13.8 11/20/2013    A1C 7.6 09/12/2007    A1C 8.3 08/18/2006     A1C tested: FAILED    Last LDL:    Lab Results   Component Value Date    CHOL 185 08/03/2018     Lab Results   Component Value Date    HDL 34 08/03/2018     Lab Results   Component Value Date    LDL 84 08/03/2018     Lab Results   Component Value Date    TRIG 336 08/03/2018     Lab Results   Component Value Date    CHOLHDLRATIO 5.4 09/12/2007     Lab Results   Component Value Date    NHDL 151 08/03/2018       Is the patient on a Statin? YES             Is the patient on Aspirin? YES    Medications     HMG CoA Reductase Inhibitors    rosuvastatin (CRESTOR) 20 MG tablet    Salicylates    ASPIRIN 81 MG OR TABS          Last three blood pressure readings:  BP Readings from Last 3 Encounters:   08/03/18 (!) 172/98   12/02/13 118/80   11/20/13 128/80        Tobacco History:     History   Smoking Status     Never Smoker   Smokeless Tobacco     Current User     Types: Snuff, Chew         Hypertension   Last three blood pressure readings:  BP Readings from Last 3 Encounters:   08/03/18 (!) 172/98   12/02/13 118/80   11/20/13 128/80     Blood pressure: FAILED    HTN Guidelines:  Age 18-59 BP range:  Less than 140/90  Age 60-85 with Diabetes:  Less than 140/90  Age 60-85 without Diabetes:  less than 150/90      Composite cancer screening  Chart review shows that this  patient is due/due soon for the following Colonoscopy will schedule on own time.

## 2018-09-26 ENCOUNTER — TELEPHONE (OUTPATIENT)
Dept: FAMILY MEDICINE | Facility: OTHER | Age: 58
End: 2018-09-26

## 2018-09-26 NOTE — LETTER
[facility]  [address]   [facility] [address]  Phone: [phone]  September 28, 2018      Adrian Wagner  [address] [facility] [address]      Dear Adrian,    We care about your health and have reviewed your health plan including your medical conditions, medications, and lab results.  Based on this review, it is recommended that you follow up regarding the following health topic(s):  -High Blood Pressure    We recommend you take the following action(s):  -schedule a FREE FLOAT MA-ONLY BLOOD PRESSURE APPOINTMENT within the next 1-4 weeks.  -schedule a FOLLOWUP APPOINTMENT.     Please call us at the [facility] - [phone] (or use [employer]) to address the above recommendations.     Thank you for trusting [facility] and we appreciate the opportunity to serve you.  We look forward to supporting your healthcare needs in the future.    Healthy Regards,    Your Health Care Team  [facility]

## 2018-09-26 NOTE — TELEPHONE ENCOUNTER
Left message for patient to call back. Please see message below and help schedule appointment.  Mariaelena Miguel, CMA

## 2018-11-15 ENCOUNTER — TELEPHONE (OUTPATIENT)
Dept: FAMILY MEDICINE | Facility: OTHER | Age: 58
End: 2018-11-15

## 2018-11-15 NOTE — LETTER
[facility]  [address] [facility]   [facility] [address]  Phone: [phone]  November 16, 2018      Adrian Wagner  [address] [place] [id number]      Dear Adrian,    We care about your health and have reviewed your health plan including your medical conditions, medications, and lab results.  Based on this review, it is recommended that you follow up regarding the following health topic(s):  -Diabetes  -High Blood Pressure    We recommend you take the following action(s):  -schedule a FOLLOWUP OFFICE APPOINTMENT.  We will perform the following labs:  BMP (basic metabolic panel).  -schedule a COLONOSCOPY to look for colon cancer (due every 10 years or 5 years in higher risk situations.)  Colonoscopies can prevent 90-95% of colon cancer deaths.  Problem lesions can be removed before they ever become cancer.  If you do not wish to do a colonoscopy or cannot afford to do one at this time, there is another option called a Fecal Immunochemical Occult Blood Test (FIT) a take home stool sample kit.  It does not replace the colonoscopy for colorectal cancer screening, but it can detect hidden bleeding in the lower colon.  It does need to be repeated every year and if a positive result is obtained, you would be referred for a colonoscopy.  If you have completed either one of these tests at another facility, please have the records sent to our clinic for our records.     Please call us at the [facility] - [phone] (or use [employer]) to address the above recommendations.     Thank you for trusting [facility] and we appreciate the opportunity to serve you.  We look forward to supporting your healthcare needs in the future.    Healthy Regards,    Your Health Care Team  [facility]

## 2018-11-15 NOTE — TELEPHONE ENCOUNTER
Panel Management Review    Summary:    Patient is due/failing the following:   Flu, BMP, COLONOSCOPY and FOLLOW UP    Action needed:   Patient needs office visit for Diabetes/Hypertention and Patient needs non-fasting lab only appointment    Type of outreach:    Phone, left message for patient to call back.     Questions for provider review:    None                                                                                                                                    Idania Brandon CMA (AAMA)       Chart routed to Care Team .      Patient has the following on his problem list:     Diabetes    ASA: Passed    Last A1C  Lab Results   Component Value Date    A1C 10.2 08/03/2018    A1C 13.8 11/20/2013    A1C 7.6 09/12/2007    A1C 8.3 08/18/2006     A1C tested: FAILED    Last LDL:    Lab Results   Component Value Date    CHOL 185 08/03/2018     Lab Results   Component Value Date    HDL 34 08/03/2018     Lab Results   Component Value Date    LDL 84 08/03/2018     Lab Results   Component Value Date    TRIG 336 08/03/2018     Lab Results   Component Value Date    CHOLHDLRATIO 5.4 09/12/2007     Lab Results   Component Value Date    NHDL 151 08/03/2018       Is the patient on a Statin? YES             Is the patient on Aspirin? YES    Medications     HMG CoA Reductase Inhibitors    rosuvastatin (CRESTOR) 20 MG tablet    Salicylates    ASPIRIN 81 MG OR TABS          Last three blood pressure readings:  BP Readings from Last 3 Encounters:   08/03/18 (!) 172/98   12/02/13 118/80   11/20/13 128/80       Date of last diabetes office visit: 8/3/18     Tobacco History:     History   Smoking Status     Never Smoker   Smokeless Tobacco     Current User     Types: Snuff, Chew         Hypertension   Last three blood pressure readings:  BP Readings from Last 3 Encounters:   08/03/18 (!) 172/98   12/02/13 118/80   11/20/13 128/80     Blood pressure: FAILED    HTN Guidelines:  Age 18-59 BP range:  Less than 140/90  Age  60-85 with Diabetes:  Less than 140/90  Age 60-85 without Diabetes:  less than 150/90      Composite cancer screening  Chart review shows that this patient is due/due soon for the following Colonoscopy

## 2018-11-16 NOTE — TELEPHONE ENCOUNTER
LM for patient to return phone call to clinic about message below.  Letter sent to patient.  Idania Brandon CMA ([facility])

## 2018-12-13 DIAGNOSIS — I10 HYPERTENSION GOAL BP (BLOOD PRESSURE) < 140/90: ICD-10-CM

## 2018-12-13 RX ORDER — LISINOPRIL AND HYDROCHLOROTHIAZIDE 20; 25 MG/1; MG/1
1 TABLET ORAL DAILY
Qty: 90 TABLET | Refills: 1 | OUTPATIENT
Start: 2018-12-13

## 2018-12-13 NOTE — TELEPHONE ENCOUNTER
Prinzide:  Sent 8/3/18 with 6 month supply. Refill not appropriate at this time. Numerous outreaches have been made to contact patient - will close at this time.     Skylar Castillo, RN, BSN

## 2018-12-20 ENCOUNTER — TELEPHONE (OUTPATIENT)
Dept: FAMILY MEDICINE | Facility: OTHER | Age: 58
End: 2018-12-20

## 2018-12-20 NOTE — TELEPHONE ENCOUNTER
Panel Management Review    Summary:    Patient is due/failing the following:   BMP, COLONOSCOPY and FOLLOW UP    Action needed:   Patient needs office visit for Hypertention/Diabetes and needs non-fasting lab only appointment.  Patient needs referral/order: Colonoscopy.    Type of outreach:    Phone, left message for patient to call back.     Questions for provider review:    None                                                                                                                                    Idania Brandon CMA (AAMA)       Chart routed to Care Team .      Patient has the following on his problem list:     Diabetes    ASA: Passed    Last A1C  Lab Results   Component Value Date    A1C 10.2 08/03/2018    A1C 13.8 11/20/2013    A1C 7.6 09/12/2007    A1C 8.3 08/18/2006     A1C tested: FAILED    Last LDL:    Lab Results   Component Value Date    CHOL 185 08/03/2018     Lab Results   Component Value Date    HDL 34 08/03/2018     Lab Results   Component Value Date    LDL 84 08/03/2018     Lab Results   Component Value Date    TRIG 336 08/03/2018     Lab Results   Component Value Date    CHOLHDLRATIO 5.4 09/12/2007     Lab Results   Component Value Date    NHDL 151 08/03/2018       Is the patient on a Statin? YES             Is the patient on Aspirin? YES    Medications     HMG CoA Reductase Inhibitors    rosuvastatin (CRESTOR) 20 MG tablet    Salicylates    ASPIRIN 81 MG OR TABS          Last three blood pressure readings:  BP Readings from Last 3 Encounters:   08/03/18 (!) 172/98   12/02/13 118/80   11/20/13 128/80       Date of last diabetes office visit: 8/3/18     Tobacco History:     History   Smoking Status     Never Smoker   Smokeless Tobacco     Current User     Types: Snuff, Chew         Hypertension   Last three blood pressure readings:  BP Readings from Last 3 Encounters:   08/03/18 (!) 172/98   12/02/13 118/80   11/20/13 128/80     Blood pressure: FAILED    HTN Guidelines:  Age 18-59 BP  range:  Less than 140/90  Age 60-85 with Diabetes:  Less than 140/90  Age 60-85 without Diabetes:  less than 150/90      Composite cancer screening  Chart review shows that this patient is due/due soon for the following Colonoscopy

## 2018-12-21 NOTE — TELEPHONE ENCOUNTER
Left a detailed message for Adrian that he needs an office visit and to call back and we will help him schedule this

## 2018-12-31 DIAGNOSIS — I10 HYPERTENSION GOAL BP (BLOOD PRESSURE) < 140/90: ICD-10-CM

## 2018-12-31 RX ORDER — LISINOPRIL AND HYDROCHLOROTHIAZIDE 20; 25 MG/1; MG/1
1 TABLET ORAL DAILY
Qty: 90 TABLET | Refills: 1 | Status: CANCELLED | OUTPATIENT
Start: 2018-12-31

## 2019-01-02 NOTE — TELEPHONE ENCOUNTER
Lisinopril-hydrochlorothiazide    Sent 8/3/2018 with 6 month supply. Refill not appropriate at this time.     Nel Jordan, RN, BSN

## 2019-01-09 DIAGNOSIS — I10 HYPERTENSION GOAL BP (BLOOD PRESSURE) < 140/90: ICD-10-CM

## 2019-01-10 RX ORDER — LISINOPRIL AND HYDROCHLOROTHIAZIDE 20; 25 MG/1; MG/1
1 TABLET ORAL DAILY
Qty: 15 TABLET | Refills: 0 | Status: SHIPPED | OUTPATIENT
Start: 2019-01-10 | End: 2019-01-16

## 2019-01-10 NOTE — TELEPHONE ENCOUNTER
"Requested Prescriptions   Pending Prescriptions Disp Refills     lisinopril-hydrochlorothiazide (PRINZIDE/ZESTORETIC) 20-25 MG tablet 90 tablet 1     Sig: Take 1 tablet by mouth daily    Diuretics (Including Combos) Protocol Failed - 1/9/2019 10:47 AM       Failed - Blood pressure under 140/90 in past 12 months    BP Readings from Last 3 Encounters:   08/03/18 (!) 172/98   12/02/13 118/80   11/20/13 128/80          Passed - Recent (12 mo) or future (30 days) visit within the authorizing provider's specialty    Patient had office visit in the last 12 months or has a visit in the next 30 days with authorizing provider or within the authorizing provider's specialty.  See \"Patient Info\" tab in inbasket, or \"Choose Columns\" in Meds & Orders section of the refill encounter.         Passed - Medication is active on med list       Passed - Patient is age 18 or older       Passed - Normal serum creatinine on file in past 12 months    Recent Labs   Lab Test 08/03/18  1145   CR 0.84          Passed - Normal serum potassium on file in past 12 months    Recent Labs   Lab Test 08/03/18  1145   POTASSIUM 3.4          Passed - Normal serum sodium on file in past 12 months    Recent Labs   Lab Test 08/03/18  1145              Routing refill request to provider for review/approval because:  Provider to review and advise.    Patient has Lisinopril 20mg AND lisinopril-hydrochlorothiazide 20-25mg on medication list    Astrid Sanchez, RN, BSN             "

## 2019-01-11 NOTE — TELEPHONE ENCOUNTER
Left message for patient to call back. Please see message below and help schedule OV.  Mariaelena Miguel, CMA

## 2019-01-11 NOTE — PROGRESS NOTES
SUBJECTIVE:   Adrian Wagner is a 58 year old male who presents to clinic today for the following health issues:      History of Present Illness     Diabetes:     Frequency of checking blood sugars::  Rarely    Diabetic concerns::  None    Hypoglycemia symptoms::  None    Paraesthesia present::  YES    Eye Exam in the last year::  NO    Diabetes Management Resources    Hyperlipidemia:     Low fat/chol diet rating::  Fair    Taking Statins::  YES    Side effects from hypolipidemia medication::  No muscle aches from Statin    Lipid Medications or Supplements::  None    Hypertension:     Outpatient blood pressures:  Are not being checked    Dietary sodium intake::  No added salt diet    Diet:  Diabetic  Frequency of exercise:  2-3 days/week  Duration of exercise:  15-30 minutes  Taking medications regularly:  Yes  Medication side effects:  None  Additional concerns today:  No      Problem list and histories reviewed & adjusted, as indicated.  Additional history: as documented        Patient Active Problem List   Diagnosis     Tobacco use disorder     Hyperlipidemia LDL goal <100     Hypertension goal BP (blood pressure) < 140/90     Contracture of ankle and foot joint     History of kidney stones     Osteomyelitis of right foot (H)     Hyperpigmented skin lesion     History of foot fracture     Type 2 diabetes mellitus with hyperglycemia, with long-term current use of insulin (H)     Type 2 diabetes mellitus with diabetic polyneuropathy, with long-term current use of insulin (H)     Past Surgical History:   Procedure Laterality Date     AMPUTATION Right 05/09/2017    right foot partial amputation     C REMOVAL OF KIDNEY STONE  04/2005       Social History     Tobacco Use     Smoking status: Never Smoker     Smokeless tobacco: Current User     Types: Snuff, Chew   Substance Use Topics     Alcohol use: Yes     Comment: occassionally, weekend     Family History   Problem Relation Age of Onset     Asthma Paternal  Grandfather      DUYEN. Paternal Grandfather 55        several paternal uncles     Heart Disease Paternal Grandfather         several  paternal uncles also.     Respiratory Paternal Grandfather         asthma     Diabetes Paternal Grandmother      Hypertension Brother      Cancer Maternal Grandmother         unknown type of cancer     Alzheimer Disease Mother         passed away at age 68     Cardiovascular Father 73        cva     Cerebrovascular Disease Father      Breast Cancer No family hx of      Cancer - colorectal No family hx of      Prostate Cancer No family hx of      Anesthesia Reaction No family hx of      Arthritis No family hx of      Blood Disease No family hx of      Lipids No family hx of      Musculoskeletal Disorder No family hx of      Neurologic Disorder No family hx of      Osteoporosis No family hx of      Thyroid Disease No family hx of          Current Outpatient Medications   Medication Sig Dispense Refill     ASPIRIN 81 MG OR TABS 1 tablet daily 30 12     blood glucose (NO BRAND SPECIFIED) test strip Use to test blood sugar 3 times daily or as directed. 100 each 3     Blood Glucose Monitoring Suppl (FIFTY50 GLUCOSE METER 2.0) w/Device KIT Dispense meter, test strips, lancets covered by pt ins. E11.65 NIDDM type II, uncontrolled - Test 3 times/day, Reason: Unstable diabetes       gabapentin (NEURONTIN) 300 MG capsule Take 2 capsules (600 mg) by mouth At Bedtime 180 capsule 1     GARLIC 1 tabalet every day for sinus issues       insulin glargine (LANTUS SOLOSTAR PEN) 100 UNIT/ML pen Inject 20 Units Subcutaneous every morning Increase by 2 units every other morning until the morning sugars are consistently less than 200 or when you reach 30 units - which ever happens first 15 mL 1     insulin pen needle (B-D U/F) 31G X 5 MM miscellaneous Use daily or as directed. 100 each prn     lisinopril-hydrochlorothiazide (PRINZIDE/ZESTORETIC) 20-12.5 MG tablet Take 2 tablets by mouth daily 180 tablet 0  "    metFORMIN (GLUCOPHAGE) 1000 MG tablet Take 1 tablet (1,000 mg) by mouth 2 times daily (with meals) 180 tablet 1     Multiple Vitamins-Minerals (MULTI-VITAMIN/MINERALS) TABS Take 1 tablet by mouth       rosuvastatin (CRESTOR) 20 MG tablet Take 1 tablet (20 mg) by mouth At Bedtime 90 tablet 1     Allergies   Allergen Reactions     Slo-Niacin [Niacin] Hives     Recent Labs   Lab Test 01/16/19  1050 08/03/18  1145 11/20/13  1227   A1C 10.5* 10.2* 13.8*   LDL  --  84  --    HDL  --  34*  --    TRIG  --  336*  --    ALT  --  35 44   CR  --  0.84 0.83   GFRESTIMATED  --  >90 >90   GFRESTBLACK  --  >90 >90   POTASSIUM  --  3.4 4.1   TSH  --  2.40  --       BP Readings from Last 3 Encounters:   01/16/19 (!) 203/101   08/03/18 (!) 172/98   12/02/13 118/80    Wt Readings from Last 3 Encounters:   01/16/19 100.2 kg (221 lb)   08/03/18 100.2 kg (221 lb)   12/02/13 109.3 kg (241 lb)                  Labs reviewed in EPIC    ROS:  Constitutional, HEENT, cardiovascular, pulmonary, gi and gu systems are negative, except as otherwise noted.    OBJECTIVE:     BP (!) 203/101 (BP Location: Right arm, Patient Position: Chair, Cuff Size: Adult Regular)   Pulse 67   Temp 98  F (36.7  C) (Temporal)   Resp 16   Ht 1.79 m (5' 10.47\")   Wt 100.2 kg (221 lb)   SpO2 98%   BMI 31.29 kg/m    Body mass index is 31.29 kg/m .   Physical Exam   Constitutional: He is oriented to person, place, and time. He appears well-developed and well-nourished.   HENT:   Head: Normocephalic and atraumatic.   Eyes: EOM are normal.   Neck: Neck supple.   Cardiovascular: Normal rate, regular rhythm, normal heart sounds and intact distal pulses. Exam reveals no gallop and no friction rub.   No murmur heard.  Pulmonary/Chest: Effort normal and breath sounds normal.   Musculoskeletal: Normal range of motion.   Left 5th toe amuptated   Neurological: He is alert and oriented to person, place, and time.   Psychiatric: He has a normal mood and affect. His behavior " is normal.         Diagnostic Test Results:  none     ASSESSMENT/PLAN:     Problem List Items Addressed This Visit     Hyperlipidemia LDL goal <100    Relevant Medications    rosuvastatin (CRESTOR) 20 MG tablet    Hypertension goal BP (blood pressure) < 140/90     Blood pressures not well controlled .   He has not been taking hydrochlorothiazide  Restart lisinopril- 40mg and hydrochlorothiazide -25mg  He will return to pharmacy for recheck in 2 wks  Will adjust meds through a telephone visit if BP not at goal           Relevant Medications    lisinopril-hydrochlorothiazide (PRINZIDE/ZESTORETIC) 20-12.5 MG tablet    Type 2 diabetes mellitus with hyperglycemia, with long-term current use of insulin (H)     Increase lantus to 30 units gradually  Will consider adding victoza - if needed at the next visit if the numbers are not trending down  Recheck in 2 months for follow up         Relevant Medications    metFORMIN (GLUCOPHAGE) 1000 MG tablet    insulin glargine (LANTUS SOLOSTAR PEN) 100 UNIT/ML pen    insulin pen needle (B-D U/F) 31G X 5 MM miscellaneous    blood glucose (NO BRAND SPECIFIED) test strip    Other Relevant Orders    HEMOGLOBIN A1C (Completed)    Type 2 diabetes mellitus with diabetic polyneuropathy, with long-term current use of insulin (H)    Relevant Medications    metFORMIN (GLUCOPHAGE) 1000 MG tablet    gabapentin (NEURONTIN) 300 MG capsule    insulin glargine (LANTUS SOLOSTAR PEN) 100 UNIT/ML pen    blood glucose (NO BRAND SPECIFIED) test strip    Other Relevant Orders    HEMOGLOBIN A1C (Completed)      Other Visit Diagnoses     Screen for colon cancer        Screening for diabetic retinopathy        Need for prophylactic vaccination and inoculation against influenza             Chastity Umanzor MD  [facility]

## 2019-01-16 ENCOUNTER — OFFICE VISIT (OUTPATIENT)
Dept: FAMILY MEDICINE | Facility: OTHER | Age: 59
End: 2019-01-16
Payer: COMMERCIAL

## 2019-01-16 ENCOUNTER — TELEPHONE (OUTPATIENT)
Dept: FAMILY MEDICINE | Facility: OTHER | Age: 59
End: 2019-01-16

## 2019-01-16 VITALS
OXYGEN SATURATION: 98 % | BODY MASS INDEX: 31.64 KG/M2 | RESPIRATION RATE: 16 BRPM | WEIGHT: 221 LBS | DIASTOLIC BLOOD PRESSURE: 101 MMHG | HEIGHT: 70 IN | TEMPERATURE: 98 F | HEART RATE: 67 BPM | SYSTOLIC BLOOD PRESSURE: 203 MMHG

## 2019-01-16 DIAGNOSIS — E11.42 TYPE 2 DIABETES MELLITUS WITH DIABETIC POLYNEUROPATHY, WITH LONG-TERM CURRENT USE OF INSULIN (H): ICD-10-CM

## 2019-01-16 DIAGNOSIS — Z79.4 TYPE 2 DIABETES MELLITUS WITH DIABETIC POLYNEUROPATHY, WITH LONG-TERM CURRENT USE OF INSULIN (H): ICD-10-CM

## 2019-01-16 DIAGNOSIS — E11.65 TYPE 2 DIABETES MELLITUS WITH HYPERGLYCEMIA, WITH LONG-TERM CURRENT USE OF INSULIN (H): ICD-10-CM

## 2019-01-16 DIAGNOSIS — Z79.4 TYPE 2 DIABETES MELLITUS WITH HYPERGLYCEMIA, WITH LONG-TERM CURRENT USE OF INSULIN (H): ICD-10-CM

## 2019-01-16 DIAGNOSIS — I10 HYPERTENSION GOAL BP (BLOOD PRESSURE) < 140/90: ICD-10-CM

## 2019-01-16 DIAGNOSIS — E78.5 HYPERLIPIDEMIA LDL GOAL <100: ICD-10-CM

## 2019-01-16 DIAGNOSIS — Z13.5 SCREENING FOR DIABETIC RETINOPATHY: ICD-10-CM

## 2019-01-16 LAB — HBA1C MFR BLD: 10.5 % (ref 0–5.6)

## 2019-01-16 PROCEDURE — 99214 OFFICE O/P EST MOD 30 MIN: CPT | Performed by: FAMILY MEDICINE

## 2019-01-16 PROCEDURE — 83036 HEMOGLOBIN GLYCOSYLATED A1C: CPT | Performed by: FAMILY MEDICINE

## 2019-01-16 PROCEDURE — 36415 COLL VENOUS BLD VENIPUNCTURE: CPT | Performed by: FAMILY MEDICINE

## 2019-01-16 RX ORDER — GABAPENTIN 300 MG/1
600 CAPSULE ORAL AT BEDTIME
Qty: 180 CAPSULE | Refills: 1 | Status: SHIPPED | OUTPATIENT
Start: 2019-01-16

## 2019-01-16 RX ORDER — ROSUVASTATIN CALCIUM 20 MG/1
20 TABLET, COATED ORAL AT BEDTIME
Qty: 90 TABLET | Refills: 1 | Status: SHIPPED | OUTPATIENT
Start: 2019-01-16

## 2019-01-16 RX ORDER — LISINOPRIL AND HYDROCHLOROTHIAZIDE 20; 25 MG/1; MG/1
1 TABLET ORAL DAILY
Qty: 15 TABLET | Refills: 0 | Status: CANCELLED | OUTPATIENT
Start: 2019-01-16

## 2019-01-16 RX ORDER — LISINOPRIL AND HYDROCHLOROTHIAZIDE 12.5; 2 MG/1; MG/1
2 TABLET ORAL DAILY
Qty: 180 TABLET | Refills: 0 | Status: SHIPPED | OUTPATIENT
Start: 2019-01-16 | End: 2019-04-27

## 2019-01-16 ASSESSMENT — PAIN SCALES - GENERAL: PAINLEVEL: NO PAIN (0)

## 2019-01-16 ASSESSMENT — MIFFLIN-ST. JEOR: SCORE: 1831.16

## 2019-01-16 NOTE — ASSESSMENT & PLAN NOTE
Blood pressures not well controlled .   He has not been taking hydrochlorothiazide  Restart lisinopril- 40mg and hydrochlorothiazide -25mg  He will return to pharmacy for recheck in 2 wks  Will adjust meds through a telephone visit if BP not at goal

## 2019-01-16 NOTE — ASSESSMENT & PLAN NOTE
Increase lantus to 30 units gradually  Will consider adding victoza - if needed at the next visit if the numbers are not trending down  Recheck in 2 months for follow up

## 2019-01-16 NOTE — TELEPHONE ENCOUNTER
Prior Authorization Retail Medication Request    Medication/Dose: insulin glargine (LANTUS SOLOSTAR PEN) 100 UNIT/ML pen  ICD code (if different than what is on RX):    Previously Tried and Failed:    Rationale:      Insurance Name:    Insurance ID:        Pharmacy Information (if different than what is on RX)  Name:  [facility]  Phone:  [phone]

## 2019-01-19 DIAGNOSIS — E11.65 TYPE 2 DIABETES MELLITUS WITH HYPERGLYCEMIA, WITH LONG-TERM CURRENT USE OF INSULIN (H): ICD-10-CM

## 2019-01-19 DIAGNOSIS — E11.42 TYPE 2 DIABETES MELLITUS WITH DIABETIC POLYNEUROPATHY, WITH LONG-TERM CURRENT USE OF INSULIN (H): ICD-10-CM

## 2019-01-19 DIAGNOSIS — Z79.4 TYPE 2 DIABETES MELLITUS WITH HYPERGLYCEMIA, WITH LONG-TERM CURRENT USE OF INSULIN (H): ICD-10-CM

## 2019-01-19 DIAGNOSIS — Z79.4 TYPE 2 DIABETES MELLITUS WITH DIABETIC POLYNEUROPATHY, WITH LONG-TERM CURRENT USE OF INSULIN (H): ICD-10-CM

## 2019-01-22 RX ORDER — INSULIN GLARGINE 100 [IU]/ML
30 INJECTION, SOLUTION SUBCUTANEOUS DAILY
Qty: 45 ML | Refills: 0 | Status: SHIPPED | OUTPATIENT
Start: 2019-01-22 | End: 2019-03-13

## 2019-01-22 NOTE — TELEPHONE ENCOUNTER
LM for patient to return phone call to clinic about message below.  Idania Brandon CMA ([facility])

## 2019-01-22 NOTE — TELEPHONE ENCOUNTER
Prescription sent to the pharmacy.  Please have him come to the pharmacy for a blood pressure recheck in the next week.

## 2019-01-22 NOTE — TELEPHONE ENCOUNTER
"Requested Prescriptions   Pending Prescriptions Disp Refills     insulin glargine (BASAGLAR KWIKPEN) 100 UNIT/ML pen [Pharmacy Med Name: BASAGLAR 100 U/ML KWIKPEN INJ 3ML]  0     Sig: ADMINISTER 15 UNITS UNDER THE SKIN BEFORE BREAKFAST    Long Acting Insulin Protocol Failed - 1/19/2019  4:29 PM       Failed - Blood pressure less than 140/90 in past 6 months    BP Readings from Last 3 Encounters:   01/16/19 (!) 203/101   08/03/18 (!) 172/98   12/02/13 118/80          Passed - LDL on file in past 12 months    Recent Labs   Lab Test 08/03/18  1145   LDL 84          Passed - Microalbumin on file in past 12 months    Recent Labs   Lab Test 08/03/18  1146   MICROL 288   UMALCR 1,734.94*          Passed - Serum creatinine on file in past 12 months    Recent Labs   Lab Test 08/03/18  1145   CR 0.84          Passed - HgbA1C in past 3 or 6 months    If HgbA1C is 8 or greater, it needs to be on file within the past 3 months.  If less than 8, must be on file within the past 6 months.     Recent Labs   Lab Test 01/16/19  1050   A1C 10.5*          Passed - Medication is active on med list       Passed - Patient is age 18 or older       Passed - Recent (6 mo) or future (30 days) visit within the authorizing provider's specialty    Patient had office visit in the last 6 months or has a visit in the next 30 days with authorizing provider or within the authorizing provider's specialty.  See \"Patient Info\" tab in inbasket, or \"Choose Columns\" in Meds & Orders section of the refill encounter.            Routing refill request to provider for review/approval because:  BP and labs          "

## 2019-01-22 NOTE — TELEPHONE ENCOUNTER
This PA request was submitted to the insurance by the Central Prior Authorization Team.  If you have any questions in regards to this request, we can be reached at [phone].     Thanks    PA Initiation    Medication: insulin glargine (LANTUS SOLOSTAR PEN) 100 UNIT/ML pen  Insurance Company: [employer] - Phone [phone] Fax [phone]  Pharmacy Filling the Rx: [facility] [employer] [address] [place] [id number] [address] AT [facility]  [address]  Filling Pharmacy Phone: [phone]  Filling Pharmacy Fax:    Start Date: 1/22/2019

## 2019-01-23 NOTE — TELEPHONE ENCOUNTER
PRIOR AUTHORIZATION DENIED    Medication: insulin glargine (LANTUS SOLOSTAR PEN) 100 UNIT/ML pen - DENIED     Denial Date: 1/22/2019    Denial Rational: You must first try and fail all 3 formulary alternatives: Basaglar, Levemir and Tresiba           Appeal Information:  Please provide a letter of medical necessity

## 2019-01-28 ENCOUNTER — TELEPHONE (OUTPATIENT)
Dept: FAMILY MEDICINE | Facility: OTHER | Age: 59
End: 2019-01-28

## 2019-01-28 DIAGNOSIS — Z79.4 TYPE 2 DIABETES MELLITUS WITH DIABETIC POLYNEUROPATHY, WITH LONG-TERM CURRENT USE OF INSULIN (H): Primary | ICD-10-CM

## 2019-01-28 DIAGNOSIS — E11.42 TYPE 2 DIABETES MELLITUS WITH DIABETIC POLYNEUROPATHY, WITH LONG-TERM CURRENT USE OF INSULIN (H): Primary | ICD-10-CM

## 2019-01-28 RX ORDER — BLOOD-GLUCOSE METER
EACH MISCELLANEOUS
Qty: 1 KIT | Refills: 0 | Status: SHIPPED | OUTPATIENT
Start: 2019-01-28 | End: 2020-01-28

## 2019-01-30 ENCOUNTER — ALLIED HEALTH/NURSE VISIT (OUTPATIENT)
Dept: FAMILY MEDICINE | Facility: OTHER | Age: 59
End: 2019-01-30
Payer: COMMERCIAL

## 2019-01-30 VITALS — DIASTOLIC BLOOD PRESSURE: 92 MMHG | SYSTOLIC BLOOD PRESSURE: 138 MMHG

## 2019-01-30 DIAGNOSIS — I10 HYPERTENSION GOAL BP (BLOOD PRESSURE) < 140/90: Primary | ICD-10-CM

## 2019-01-30 PROCEDURE — 99207 ZZC NO CHARGE NURSE ONLY: CPT | Performed by: FAMILY MEDICINE

## 2019-01-30 NOTE — Clinical Note
Routing message to PCP for review -BP checked at pharmacy and noted to be above goal. Per pt is much better than it has been.  Will wait to hear from provider for recommendation.

## 2019-01-30 NOTE — PROGRESS NOTES
Adrian Wagner is enrolled/participating in the retail pharmacy Blood Pressure Goals Achievement Program (BPGAP).  Adrian Wagner was evaluated at [facility] on January 30, 2019 at which time his blood pressure was:    BP Readings from Last 3 Encounters:   01/30/19 (!) 138/92   01/16/19 (!) 203/101   08/03/18 (!) 172/98     Reviewed lifestyle modifications for blood pressure control and reduction: including making healthy food choices, managing weight, getting regular exercise, smoking cessation, reducing alcohol consumption, monitoring blood pressure regularly.     Adrian Wagner is not experiencing symptoms.    Follow-Up: BP is not at goal of < 140/90mmHg (patient 18+ years of age with or without diabetes), Recommended follow-up with PCP.  Routing to PCP for further review.      This note completed by:  Tatum Loaiza, Pharm.D., [facility], [phone]

## 2019-03-06 NOTE — PROGRESS NOTES
SUBJECTIVE:   Adrian Wagner is a 58 year old male who presents to clinic today for the following health issues:      History of Present Illness     Diabetes:     Frequency of checking blood sugars::  1 time a day    Diabetic concerns::  None    Hypoglycemia symptoms::  None    Paraesthesia present::  YES    Eye Exam in the last year::  NO    Diabetes Management Resources    Hypertension:     Outpatient blood pressures:  Are not being checked    Dietary sodium intake::  Not monitoring salt intake    Diet:  Diabetic  Frequency of exercise:  2-3 days/week  Duration of exercise:  15-30 minutes  Taking medications regularly:  Yes  Medication side effects:  None  Additional concerns today:  No      Problem list and histories reviewed & adjusted, as indicated.  Additional history: as documented        Patient Active Problem List   Diagnosis     Tobacco use disorder     Hyperlipidemia LDL goal <100     Hypertension goal BP (blood pressure) < 140/90     Contracture of ankle and foot joint     History of kidney stones     Hyperpigmented skin lesion     History of foot fracture     Type 2 diabetes mellitus with hyperglycemia, with long-term current use of insulin (H)     Type 2 diabetes mellitus with diabetic polyneuropathy, with long-term current use of insulin (H)     Past Surgical History:   Procedure Laterality Date     AMPUTATION Right 05/09/2017    right foot partial amputation     C REMOVAL OF KIDNEY STONE  04/2005       Social History     Tobacco Use     Smoking status: Never Smoker     Smokeless tobacco: Current User     Types: Snuff, Chew   Substance Use Topics     Alcohol use: Yes     Comment: occassionally, weekend     Family History   Problem Relation Age of Onset     Asthma Paternal Grandfather      C.A.D. Paternal Grandfather 55        several paternal uncles     Heart Disease Paternal Grandfather         several  paternal uncles also.     Respiratory Paternal Grandfather         asthma     Diabetes Paternal  Grandmother      Hypertension Brother      Cancer Maternal Grandmother         unknown type of cancer     Alzheimer Disease Mother         passed away at age 68     Cardiovascular Father 73        cva     Cerebrovascular Disease Father      Breast Cancer No family hx of      Cancer - colorectal No family hx of      Prostate Cancer No family hx of      Anesthesia Reaction No family hx of      Arthritis No family hx of      Blood Disease No family hx of      Lipids No family hx of      Musculoskeletal Disorder No family hx of      Neurologic Disorder No family hx of      Osteoporosis No family hx of      Thyroid Disease No family hx of          Current Outpatient Medications   Medication Sig Dispense Refill     amLODIPine (NORVASC) 5 MG tablet Take 1 tablet (5 mg) by mouth daily 90 tablet 0     ASPIRIN 81 MG OR TABS 1 tablet daily 30 12     blood glucose (ACCU-CHEK BART) test strip Use to test blood sugar 3 times daily or as directed. 100 each 3     blood glucose (NO BRAND SPECIFIED) test strip Use to test blood sugar 3 times daily or as directed. 100 each 3     blood glucose monitoring (ACCU-CHEK BART PLUS) meter device kit Use to test blood sugar 3 times daily or as directed. 1 kit 0     blood glucose monitoring (ACCU-CHEK MULTICLIX) lancets Use to test blood sugar 3 times daily or as directed. 102 each 3     Blood Glucose Monitoring Suppl (FIFTY50 GLUCOSE METER 2.0) w/Device KIT Dispense meter, test strips, lancets covered by pt ins. E11.65 NIDDM type II, uncontrolled - Test 3 times/day, Reason: Unstable diabetes       gabapentin (NEURONTIN) 300 MG capsule Take 2 capsules (600 mg) by mouth At Bedtime 180 capsule 1     GARLIC 1 tabalet every day for sinus issues       insulin glargine (BASAGLAR KWIKPEN) 100 UNIT/ML pen Inject 30 Units Subcutaneous daily Increase by 2 units every other day until you reach 40 units 45 mL 1     insulin pen needle (B-D U/F) 31G X 5 MM miscellaneous Use daily or as directed. 100 each  "prn     lisinopril-hydrochlorothiazide (PRINZIDE/ZESTORETIC) 20-12.5 MG tablet Take 2 tablets by mouth daily 180 tablet 0     metFORMIN (GLUCOPHAGE) 1000 MG tablet Take 1 tablet (1,000 mg) by mouth 2 times daily (with meals) 180 tablet 1     Multiple Vitamins-Minerals (MULTI-VITAMIN/MINERALS) TABS Take 1 tablet by mouth       rosuvastatin (CRESTOR) 20 MG tablet Take 1 tablet (20 mg) by mouth At Bedtime 90 tablet 1     Allergies   Allergen Reactions     Slo-Niacin [Niacin] Hives     Recent Labs   Lab Test 01/16/19  1050 08/03/18  1145 11/20/13  1227   A1C 10.5* 10.2* 13.8*   LDL  --  84  --    HDL  --  34*  --    TRIG  --  336*  --    ALT  --  35 44   CR  --  0.84 0.83   GFRESTIMATED  --  >90 >90   GFRESTBLACK  --  >90 >90   POTASSIUM  --  3.4 4.1   TSH  --  2.40  --       BP Readings from Last 3 Encounters:   03/13/19 (!) 144/100   01/30/19 (!) 138/92   01/16/19 (!) 203/101    Wt Readings from Last 3 Encounters:   03/13/19 103 kg (227 lb)   01/16/19 100.2 kg (221 lb)   08/03/18 100.2 kg (221 lb)                  Labs reviewed in EPIC    ROS:  Constitutional, HEENT, cardiovascular, pulmonary, GI, , musculoskeletal, neuro, skin, endocrine and psych systems are negative, except as otherwise noted.    OBJECTIVE:     BP (!) 144/100 (BP Location: Left arm, Patient Position: Chair, Cuff Size: Adult Large)   Pulse 69   Temp 97.9  F (36.6  C) (Temporal)   Resp 18   Ht 1.79 m (5' 10.47\")   Wt 103 kg (227 lb)   SpO2 100%   BMI 32.14 kg/m    Body mass index is 32.14 kg/m .   Physical Exam   Constitutional: He appears well-developed and well-nourished.   HENT:   Head: Normocephalic and atraumatic.   Cardiovascular: Normal rate, regular rhythm, normal heart sounds and intact distal pulses. Exam reveals no gallop and no friction rub.   No murmur heard.  Pulmonary/Chest: Effort normal and breath sounds normal.   Psychiatric: He has a normal mood and affect. His behavior is normal. Judgment and thought content normal. "         Diagnostic Test Results:  none     ASSESSMENT/PLAN:     Problem List Items Addressed This Visit     Hypertension goal BP (blood pressure) < 140/90     Continue lisinopril hydrochlorothiazide at the same dose.  Add amlodipine to control blood pressures better.  Recheck in 1 month for follow-up.         Type 2 diabetes mellitus with hyperglycemia, with long-term current use of insulin (H)     Patient has been having improved blood sugars since increasing the dose of Lantus.  Still having more sugars around 160-170 fasting blood sugars.  I advised he increase the best regular by 2 more units every other day until he reaches 40 units.  Continue metformin.           Relevant Medications    insulin glargine (BASAGLAR KWIKPEN) 100 UNIT/ML pen    Other Relevant Orders    GASTROENTEROLOGY ADULT REF PROCEDURE ONLY    Type 2 diabetes mellitus with diabetic polyneuropathy, with long-term current use of insulin (H)    Relevant Medications    insulin glargine (BASAGLAR KWIKPEN) 100 UNIT/ML pen    Other Relevant Orders    Hemoglobin A1c      Other Visit Diagnoses     Benign essential hypertension    -  Primary    Relevant Medications    amLODIPine (NORVASC) 5 MG tablet    Screening for diabetic retinopathy        Need for prophylactic vaccination and inoculation against influenza               Chastity Umanzor MD  [facility]

## 2019-03-13 ENCOUNTER — OFFICE VISIT (OUTPATIENT)
Dept: FAMILY MEDICINE | Facility: OTHER | Age: 59
End: 2019-03-13
Payer: COMMERCIAL

## 2019-03-13 VITALS
HEIGHT: 70 IN | DIASTOLIC BLOOD PRESSURE: 100 MMHG | WEIGHT: 227 LBS | RESPIRATION RATE: 18 BRPM | HEART RATE: 69 BPM | BODY MASS INDEX: 32.5 KG/M2 | SYSTOLIC BLOOD PRESSURE: 144 MMHG | OXYGEN SATURATION: 100 % | TEMPERATURE: 97.9 F

## 2019-03-13 DIAGNOSIS — Z79.4 TYPE 2 DIABETES MELLITUS WITH DIABETIC POLYNEUROPATHY, WITH LONG-TERM CURRENT USE OF INSULIN (H): ICD-10-CM

## 2019-03-13 DIAGNOSIS — Z23 NEED FOR PROPHYLACTIC VACCINATION AND INOCULATION AGAINST INFLUENZA: ICD-10-CM

## 2019-03-13 DIAGNOSIS — I10 BENIGN ESSENTIAL HYPERTENSION: Primary | ICD-10-CM

## 2019-03-13 DIAGNOSIS — I10 HYPERTENSION GOAL BP (BLOOD PRESSURE) < 140/90: ICD-10-CM

## 2019-03-13 DIAGNOSIS — E11.65 TYPE 2 DIABETES MELLITUS WITH HYPERGLYCEMIA, WITH LONG-TERM CURRENT USE OF INSULIN (H): ICD-10-CM

## 2019-03-13 DIAGNOSIS — E11.42 TYPE 2 DIABETES MELLITUS WITH DIABETIC POLYNEUROPATHY, WITH LONG-TERM CURRENT USE OF INSULIN (H): ICD-10-CM

## 2019-03-13 DIAGNOSIS — Z13.5 SCREENING FOR DIABETIC RETINOPATHY: ICD-10-CM

## 2019-03-13 DIAGNOSIS — Z79.4 TYPE 2 DIABETES MELLITUS WITH HYPERGLYCEMIA, WITH LONG-TERM CURRENT USE OF INSULIN (H): ICD-10-CM

## 2019-03-13 PROBLEM — M86.9 OSTEOMYELITIS OF RIGHT FOOT (H): Status: RESOLVED | Noted: 2017-05-01 | Resolved: 2019-03-13

## 2019-03-13 PROCEDURE — 99214 OFFICE O/P EST MOD 30 MIN: CPT | Performed by: FAMILY MEDICINE

## 2019-03-13 RX ORDER — INSULIN GLARGINE 100 [IU]/ML
30 INJECTION, SOLUTION SUBCUTANEOUS DAILY
Qty: 45 ML | Refills: 1 | Status: SHIPPED | OUTPATIENT
Start: 2019-03-13

## 2019-03-13 RX ORDER — AMLODIPINE BESYLATE 5 MG/1
5 TABLET ORAL DAILY
Qty: 90 TABLET | Refills: 0 | Status: SHIPPED | OUTPATIENT
Start: 2019-03-13

## 2019-03-13 ASSESSMENT — MIFFLIN-ST. JEOR: SCORE: 1858.38

## 2019-03-13 ASSESSMENT — PAIN SCALES - GENERAL: PAINLEVEL: NO PAIN (0)

## 2019-03-14 ENCOUNTER — TELEPHONE (OUTPATIENT)
Dept: FAMILY MEDICINE | Facility: OTHER | Age: 59
End: 2019-03-14

## 2019-03-14 NOTE — LETTER
[facility] [address] [address] [phone]      March 18, 2019      Adrian Wagner  [address] [place] [id number]      Dear Adrian:     To better serve you, we are sending this letter to notify you that we have attempted to contact you by telephone to schedule the following procedure(s) ordered by your physician.     ___x____   Colonoscopy   _______   Upper GI Endoscopy (EGD)   _______   Colonoscopy and Upper GI Endoscopy    To provide the highest quality of care, we strongly encourage you to call and schedule the prescribed test/procedure at your earliest convenience.   The number to the Specialty Scheduling department is [phone] and the hours are 8:00am - 4:30pm Monday through Friday.   We look forward to hearing from you.    Sincerely,    [facility]

## 2019-03-14 NOTE — TELEPHONE ENCOUNTER
Left message for patient to return call to schedule colonoscopy or EGD. If Kaleigh or Anne are unavailable, please transfer to the surgery center.

## 2019-03-15 NOTE — TELEPHONE ENCOUNTER
Left message for patient to return call to schedule EGD/colonoscopy. If Anne or Kaleigh are not available, please transfer to same day surgery

## 2019-03-16 NOTE — ASSESSMENT & PLAN NOTE
Patient has been having improved blood sugars since increasing the dose of Lantus.  Still having more sugars around 160-170 fasting blood sugars.  I advised he increase the best regular by 2 more units every other day until he reaches 40 units.  Continue metformin.

## 2019-03-16 NOTE — ASSESSMENT & PLAN NOTE
Continue lisinopril hydrochlorothiazide at the same dose.  Add amlodipine to control blood pressures better.  Recheck in 1 month for follow-up.

## 2019-03-18 NOTE — TELEPHONE ENCOUNTER
Left message for patient to return call to schedule EGD/colonoscopy. If Anne or Kaleigh are not available, please transfer to same day surgery        x3 letter sent

## 2019-03-28 NOTE — PROGRESS NOTES
"Adrian Wagner is a 58 year old male who is being evaluated via a billable telephone visit.      The patient has been notified of following:     \"This telephone visit will be conducted via a call between you and your physician/provider. We have found that certain health care needs can be provided without the need for a physical exam.  This service lets us provide the care you need with a short phone conversation.  If a prescription is necessary we can send it directly to your pharmacy.  If lab work is needed we can place an order for that and you can then stop by our lab to have the test done at a later time.    If during the course of the call the physician/provider feels a telephone visit is not appropriate, you will not be charged for this service.\"     Consent has been obtained for this service by 1 care team member: yes. See the scanned image in the medical record.    Adrian Wagner complains of  No chief complaint on file.      I have reviewed and updated the patient's Past Medical History, Social History, Family History and Medication List.          "

## 2019-04-05 ENCOUNTER — ALLIED HEALTH/NURSE VISIT (OUTPATIENT)
Dept: FAMILY MEDICINE | Facility: OTHER | Age: 59
End: 2019-04-05

## 2019-04-05 ENCOUNTER — ALLIED HEALTH/NURSE VISIT (OUTPATIENT)
Dept: FAMILY MEDICINE | Facility: OTHER | Age: 59
End: 2019-04-05
Payer: COMMERCIAL

## 2019-04-05 VITALS — DIASTOLIC BLOOD PRESSURE: 88 MMHG | HEART RATE: 76 BPM | SYSTOLIC BLOOD PRESSURE: 146 MMHG

## 2019-04-05 DIAGNOSIS — E11.65 TYPE 2 DIABETES MELLITUS WITH HYPERGLYCEMIA, WITH LONG-TERM CURRENT USE OF INSULIN (H): Primary | ICD-10-CM

## 2019-04-05 DIAGNOSIS — I10 HYPERTENSION GOAL BP (BLOOD PRESSURE) < 140/90: Primary | ICD-10-CM

## 2019-04-05 DIAGNOSIS — Z79.4 TYPE 2 DIABETES MELLITUS WITH HYPERGLYCEMIA, WITH LONG-TERM CURRENT USE OF INSULIN (H): Primary | ICD-10-CM

## 2019-04-05 DIAGNOSIS — Z79.4 TYPE 2 DIABETES MELLITUS WITH DIABETIC POLYNEUROPATHY, WITH LONG-TERM CURRENT USE OF INSULIN (H): ICD-10-CM

## 2019-04-05 DIAGNOSIS — E11.42 TYPE 2 DIABETES MELLITUS WITH DIABETIC POLYNEUROPATHY, WITH LONG-TERM CURRENT USE OF INSULIN (H): ICD-10-CM

## 2019-04-05 LAB — HBA1C MFR BLD: 8.1 % (ref 0–5.6)

## 2019-04-05 PROCEDURE — 99207 ZZC NO CHARGE NURSE ONLY: CPT

## 2019-04-05 PROCEDURE — 83036 HEMOGLOBIN GLYCOSYLATED A1C: CPT | Performed by: FAMILY MEDICINE

## 2019-04-05 PROCEDURE — 36415 COLL VENOUS BLD VENIPUNCTURE: CPT | Performed by: FAMILY MEDICINE

## 2019-04-05 PROCEDURE — 99207 ZZC NO CHARGE NURSE ONLY: CPT | Performed by: FAMILY MEDICINE

## 2019-04-05 NOTE — PROGRESS NOTES
Patient brought glucometer to clinic. Downloaded and report placed in RK basket for review.   Jessica Lemus, CMA

## 2019-04-05 NOTE — PROGRESS NOTES
Adrian Wagner is enrolled/participating in the retail pharmacy Blood Pressure Goals Achievement Program (BPGAP).  Adrian Wagner was evaluated at [facility] on April 5, 2019 at which time his blood pressure was:    BP Readings from Last 3 Encounters:   04/05/19 146/88   03/13/19 (!) 144/100   01/30/19 (!) 138/92     Reviewed lifestyle modifications for blood pressure control and reduction: including making healthy food choices, managing weight, getting regular exercise, smoking cessation, reducing alcohol consumption, monitoring blood pressure regularly.     Adrian Wagner is not experiencing symptoms.    Follow-Up: BP is not at goal of < 140/90mmHg (patient 18+ years of age with or without diabetes), Recommended follow-up with PCP.  Routing to PCP for further review.    Recommendation to Provider: Increase Amlodipine to 10mg or increase Lisinopril/hctz from 20/12.5 to 20/25.    This note completed by:  Tatum Loaiza, Pharm.D., [facility], [phone]

## 2019-04-10 ENCOUNTER — VIRTUAL VISIT (OUTPATIENT)
Dept: FAMILY MEDICINE | Facility: OTHER | Age: 59
End: 2019-04-10
Payer: COMMERCIAL

## 2019-04-10 DIAGNOSIS — Z23 NEED FOR PROPHYLACTIC VACCINATION AND INOCULATION AGAINST INFLUENZA: ICD-10-CM

## 2019-04-10 DIAGNOSIS — Z12.11 SCREEN FOR COLON CANCER: ICD-10-CM

## 2019-04-11 ENCOUNTER — VIRTUAL VISIT (OUTPATIENT)
Dept: FAMILY MEDICINE | Facility: OTHER | Age: 59
End: 2019-04-11
Payer: COMMERCIAL

## 2019-04-11 DIAGNOSIS — Z79.4 TYPE 2 DIABETES MELLITUS WITH DIABETIC POLYNEUROPATHY, WITH LONG-TERM CURRENT USE OF INSULIN (H): Primary | ICD-10-CM

## 2019-04-11 DIAGNOSIS — E11.42 TYPE 2 DIABETES MELLITUS WITH DIABETIC POLYNEUROPATHY, WITH LONG-TERM CURRENT USE OF INSULIN (H): Primary | ICD-10-CM

## 2019-04-11 PROCEDURE — 99441 ZZC PHYSICIAN TELEPHONE EVALUATION 5-10 MIN: CPT | Performed by: FAMILY MEDICINE

## 2019-04-11 NOTE — PROGRESS NOTES
"Adrian Wagner is a 58 year old male who is being evaluated via a billable telephone visit.      The patient has been notified of following:     \"This telephone visit will be conducted via a call between you and your physician/provider. We have found that certain health care needs can be provided without the need for a physical exam.  This service lets us provide the care you need with a short phone conversation.  If a prescription is necessary we can send it directly to your pharmacy.  If lab work is needed we can place an order for that and you can then stop by our lab to have the test done at a later time.    If during the course of the call the physician/provider feels a telephone visit is not appropriate, you will not be charged for this service.\"     Consent has been obtained for this service by 1 care team member: yes. See the scanned image in the medical record.    Adrian Wagner complains of  No chief complaint on file.      I have reviewed and updated the patient's Past Medical History, Social History, Family History and Medication List.    ALLERGIES  Slo-niacin [niacin]    Jessica Lemus CMA   (MA signature)    Diabetes Follow-up       Patient is checking blood sugars: twice daily.         Results are as follows:         am - 11:00am, 100-110         Midnight-     Diabetic concerns: None     Symptoms of hypoglycemia (low blood sugar): none     Paresthesias (numbness or burning in feet) or sores: No     Date of last diabetic eye exam:     Additional provider notes:       Assessment/Plan:  Problem List Items Addressed This Visit     Type 2 diabetes mellitus with diabetic polyneuropathy, with long-term current use of insulin (H) - Primary     Reviewed blood sugars from the glucometer.  Improved numbers.  Will hold off on increasing or adding any other medications.  Continue best regular at 40 units daily.  Continue metformin.  Recheck A1c in 3 months.  He will try to alter diet and exercise to help improve " sugars.                 I have reviewed the note as documented above.  This accurately captures the substance of my conversation with the patient.    Total time of call between patient and provider was 6 minutes     Chastity Umanzor MD

## 2019-04-11 NOTE — ASSESSMENT & PLAN NOTE
Reviewed blood sugars from the glucometer.  Improved numbers.  Will hold off on increasing or adding any other medications.  Continue best regular at 40 units daily.  Continue metformin.  Recheck A1c in 3 months.  He will try to alter diet and exercise to help improve sugars.

## 2019-04-27 DIAGNOSIS — I10 HYPERTENSION GOAL BP (BLOOD PRESSURE) < 140/90: ICD-10-CM

## 2019-04-29 RX ORDER — LISINOPRIL AND HYDROCHLOROTHIAZIDE 12.5; 2 MG/1; MG/1
2 TABLET ORAL DAILY
Qty: 180 TABLET | Refills: 0 | Status: SHIPPED | OUTPATIENT
Start: 2019-04-29 | End: 2020-04-28

## 2019-04-29 NOTE — TELEPHONE ENCOUNTER
"Requested Prescriptions   Pending Prescriptions Disp Refills     lisinopril-hydrochlorothiazide (PRINZIDE/ZESTORETIC) 20-12.5 MG tablet [Pharmacy Med Name: LISINOPRIL-HCTZ 20/12.5MG TABLETS] 180 tablet 0     Sig: TAKE 2 TABLETS BY MOUTH DAILY       Diuretics (Including Combos) Protocol Failed - 4/27/2019  1:15 PM        Failed - Blood pressure under 140/90 in past 12 months     BP Readings from Last 3 Encounters:   04/05/19 146/88   03/13/19 (!) 144/100   01/30/19 (!) 138/92             Passed - Recent (12 mo) or future (30 days) visit within the authorizing provider's specialty     Patient had office visit in the last 12 months or has a visit in the next 30 days with authorizing provider or within the authorizing provider's specialty.  See \"Patient Info\" tab in inbasket, or \"Choose Columns\" in Meds & Orders section of the refill encounter.            Passed - Medication is active on med list        Passed - Patient is age 18 or older        Passed - Normal serum creatinine on file in past 12 months     Recent Labs   Lab Test 08/03/18  1145   CR 0.84              Passed - Normal serum potassium on file in past 12 months     Recent Labs   Lab Test 08/03/18  1145   POTASSIUM 3.4                    Passed - Normal serum sodium on file in past 12 months     Recent Labs   Lab Test 08/03/18  1145                 Last OV: 03/13/2019    Routing refill request to provider for review/approval because:  BP out of range for goal.    Wlilie Cooper, RN, BSN          "

## 2019-04-29 NOTE — TELEPHONE ENCOUNTER
Left detailed Message for Adrian - his BP med was refilled but she would like him to come in for a Blood Pressure Check at our pharmacy.

## 2019-07-24 ENCOUNTER — TELEPHONE (OUTPATIENT)
Dept: FAMILY MEDICINE | Facility: OTHER | Age: 59
End: 2019-07-24

## 2019-07-24 DIAGNOSIS — Z79.4 TYPE 2 DIABETES MELLITUS WITH HYPERGLYCEMIA, WITH LONG-TERM CURRENT USE OF INSULIN (H): Primary | ICD-10-CM

## 2019-07-24 DIAGNOSIS — E11.65 TYPE 2 DIABETES MELLITUS WITH HYPERGLYCEMIA, WITH LONG-TERM CURRENT USE OF INSULIN (H): Primary | ICD-10-CM

## 2019-07-24 NOTE — TELEPHONE ENCOUNTER
Summary:    Patient is due/failing the following:   Diabetic follow up, Eye exam, BMP, Microalbumin, A1C, COLONOSCOPY and LDL    Action needed:   Patient needs office visit for Diabetic follow up., Patient needs fasting lab only appointment and schedule a colonoscopy or complete a FIT test    Type of outreach:    Phone, left message for patient to call back.     Questions for provider review:    None                                                                                                                                    Nadia Sales       Chart routed to Care Team .        Panel Management Review      Patient has the following on his problem list:     Diabetes    ASA: Passed    Last A1C  Lab Results   Component Value Date    A1C 8.1 04/05/2019    A1C 10.5 01/16/2019    A1C 10.2 08/03/2018    A1C 13.8 11/20/2013    A1C 7.6 09/12/2007     A1C tested: FAILED    Last LDL:    Lab Results   Component Value Date    CHOL 185 08/03/2018     Lab Results   Component Value Date    HDL 34 08/03/2018     Lab Results   Component Value Date    LDL 84 08/03/2018     Lab Results   Component Value Date    TRIG 336 08/03/2018     Lab Results   Component Value Date    CHOLHDLRATIO 5.4 09/12/2007     Lab Results   Component Value Date    NHDL 151 08/03/2018       Is the patient on a Statin? YES             Is the patient on Aspirin? YES    Medications     HMG CoA Reductase Inhibitors     rosuvastatin (CRESTOR) 20 MG tablet       Salicylates     ASPIRIN 81 MG OR TABS             Last three blood pressure readings:  BP Readings from Last 3 Encounters:   04/05/19 146/88   03/13/19 (!) 144/100   01/30/19 (!) 138/92            Tobacco History:     History   Smoking Status     Never Smoker   Smokeless Tobacco     Current User     Types: Snuff, Chew         Hypertension   Last three blood pressure readings:  BP Readings from Last 3 Encounters:   04/05/19 146/88   03/13/19 (!) 144/100   01/30/19 (!) 138/92     Blood pressure:  FAILED    HTN Guidelines:  Less than 140/90      Composite cancer screening  Chart review shows that this patient is due/due soon for the following Colonoscopy

## 2019-07-24 NOTE — LETTER
[facility]  [address]   [facility] [address]  Phone: [phone]  August 28, 2019      Adrian Wagner  [address] [facility] [place], [place] [address]      Dear Adrian,    We care about your health and have reviewed your health plan including your medical conditions, medications, and lab results.  Based on this review, it is recommended that you follow up regarding the following health topic(s):  -Diabetes  -Colon Cancer Screening    We recommend you take the following action(s):  -schedule a FOLLOWUP OFFICE APPOINTMENT.  We will perform the following labs:  A1c, Lipids (fasting cholesterol - nothing to eat except water and/or meds for 8-10 hours), BMP (basic metabolic panel) and Microablumin.  -schedule a COLONOSCOPY to look for colon cancer (due every 10 years or 5 years in higher risk situations.)  Colonoscopies can prevent 90-95% of colon cancer deaths.  Problem lesions can be removed before they ever become cancer.  If you do not wish to do a colonoscopy or cannot afford to do one at this time, there is another option called a Fecal Immunochemical Occult Blood Test (FIT) a take home stool sample kit.  It does not replace the colonoscopy for colorectal cancer screening, but it can detect hidden bleeding in the lower colon.  It does need to be repeated every year and if a positive result is obtained, you would be referred for a colonoscopy.  If you have completed either one of these tests at another facility, please have the records sent to our clinic for our records.     Please call us at the [facility] - [phone] (or use [employer]) to address the above recommendations.     Thank you for trusting [facility] and we appreciate the opportunity to serve you.  We look forward to supporting your healthcare needs in the future.    Healthy Regards,    Your Health Care Team  [facility]

## 2019-08-28 ENCOUNTER — TELEPHONE (OUTPATIENT)
Dept: FAMILY MEDICINE | Facility: OTHER | Age: 59
End: 2019-08-28

## 2019-08-28 NOTE — TELEPHONE ENCOUNTER
Panel Management Review      Summary:    Patient is due/failing the following:   BMP, Microalbumin, Follow up, COLONOSCOPY, LDL and PHYSICAL    Action needed:   Patient needs office visit for Diabetes/Hypertention.  Patient needs fasting lab only appointment   Patient needs referral/order: Colonoscopy    Type of outreach:    Phone, left message for patient to call back.     Questions for provider review:    None                                                                                                                                    Idania Brandon CMA ([facility])       Chart routed to Care Team .      Patient has the following on his problem list:     Diabetes    ASA:     Last A1C  Lab Results   Component Value Date    A1C 8.1 04/05/2019    A1C 10.5 01/16/2019    A1C 10.2 08/03/2018    A1C 13.8 11/20/2013    A1C 7.6 09/12/2007     A1C tested: FAILED    Last LDL:    Lab Results   Component Value Date    CHOL 185 08/03/2018     Lab Results   Component Value Date    HDL 34 08/03/2018     Lab Results   Component Value Date    LDL 84 08/03/2018     Lab Results   Component Value Date    TRIG 336 08/03/2018     Lab Results   Component Value Date    CHOLHDLRATIO 5.4 09/12/2007     Lab Results   Component Value Date    NHDL 151 08/03/2018       Is the patient on a Statin? YES             Is the patient on Aspirin? YES    Medications     HMG CoA Reductase Inhibitors     rosuvastatin (CRESTOR) 20 MG tablet       Salicylates     ASPIRIN 81 MG OR TABS             Last three blood pressure readings:  BP Readings from Last 3 Encounters:   04/05/19 146/88   03/13/19 (!) 144/100   01/30/19 (!) 138/92       Date of last diabetes office visit: 4/11/19 (Telephone)     Tobacco History:     History   Smoking Status     Never Smoker   Smokeless Tobacco     Current User     Types: Snuff, Chew         Hypertension   Last three blood pressure readings:  BP Readings from Last 3 Encounters:   04/05/19 146/88   03/13/19 (!) 144/100    01/30/19 (!) 138/92     Blood pressure: FAILED    HTN Guidelines:  Less than 140/90      Composite cancer screening  Chart review shows that this patient is due/due soon for the following Colonoscopy

## 2019-08-28 NOTE — LETTER
[facility]  [address]   [facility] [address]  Phone: [phone]  August 29, 2019      Adrian Wagner  [address] [facility] [address]      Dear Adrian,    We care about your health and have reviewed your health plan including your medical conditions, medications, and lab results.  Based on this review, it is recommended that you follow up regarding the following health topic(s):  -Diabetes  -High Blood Pressure  -Wellness (Physical) Visit     We recommend you take the following action(s):  -schedule a WELLNESS (Physical) APPOINTMENT.  We will perform the following labs: Lipids (fasting cholesterol - nothing to eat except water and/or meds for 8-10 hours), BMP (basic metabolic panel) and Microablumin.  -schedule a COLONOSCOPY to look for colon cancer (due every 10 years or 5 years in higher risk situations.)  Colonoscopies can prevent 90-95% of colon cancer deaths.  Problem lesions can be removed before they ever become cancer.  If you do not wish to do a colonoscopy or cannot afford to do one at this time, there is another option called a Fecal Immunochemical Occult Blood Test (FIT) a take home stool sample kit.  It does not replace the colonoscopy for colorectal cancer screening, but it can detect hidden bleeding in the lower colon.  It does need to be repeated every year and if a positive result is obtained, you would be referred for a colonoscopy.  If you have completed either one of these tests at another facility, please have the records sent to our clinic for our records.     Please call us at the [facility] - [phone] (or use [employer]) to address the above recommendations.     Thank you for trusting [facility] and we appreciate the opportunity to serve you.  We look forward to supporting your healthcare needs in the future.    Healthy Regards,    Your Health Care Team  [facility]

## 2019-08-29 NOTE — TELEPHONE ENCOUNTER
LM for patient to return phone call to clinic about message below.  Letter sent.  Idania Brandon CMA ([facility])

## 2019-10-03 ENCOUNTER — TELEPHONE (OUTPATIENT)
Dept: FAMILY MEDICINE | Facility: OTHER | Age: 59
End: 2019-10-03

## 2019-10-03 NOTE — TELEPHONE ENCOUNTER
Panel Management Review    Summary:    Patient is due/failing the following:   BMP, Microalbumin, FOLLOW UP, LDL and PHYSICAL    Action needed:   Patient needs office visit for Physical/Diabetes/Hypertention.  Patient needs fasting lab only appointment  Patient needs referral/order: Colonoscpy    Type of outreach:    Phone, left message for patient to call back.     Questions for provider review:    None                                                                                                                                    Idania Brandon CMA ([facility])       Chart routed to Care Team .        Patient has the following on his problem list:     Diabetes    ASA:     Last A1C  Lab Results   Component Value Date    A1C 8.1 04/05/2019    A1C 10.5 01/16/2019    A1C 10.2 08/03/2018    A1C 13.8 11/20/2013    A1C 7.6 09/12/2007     A1C tested: FAILED    Last LDL:    Lab Results   Component Value Date    CHOL 185 08/03/2018     Lab Results   Component Value Date    HDL 34 08/03/2018     Lab Results   Component Value Date    LDL 84 08/03/2018     Lab Results   Component Value Date    TRIG 336 08/03/2018     Lab Results   Component Value Date    CHOLHDLRATIO 5.4 09/12/2007     Lab Results   Component Value Date    NHDL 151 08/03/2018       Is the patient on a Statin? YES             Is the patient on Aspirin? YES    Medications     HMG CoA Reductase Inhibitors     rosuvastatin (CRESTOR) 20 MG tablet       Salicylates     ASPIRIN 81 MG OR TABS             Last three blood pressure readings:  BP Readings from Last 3 Encounters:   04/05/19 146/88   03/13/19 (!) 144/100   01/30/19 (!) 138/92       Date of last diabetes office visit: 4/11/19 (Telephone)     Tobacco History:     History   Smoking Status     Never Smoker   Smokeless Tobacco     Current User     Types: Snuff, Chew         Hypertension   Last three blood pressure readings:  BP Readings from Last 3 Encounters:   04/05/19 146/88   03/13/19 (!) 144/100   01/30/19  (!) 138/92     Blood pressure: FAILED    HTN Guidelines:  Less than 140/90      Composite cancer screening  Chart review shows that this patient is due/due soon for the following Colonoscopy

## 2019-10-03 NOTE — LETTER
[address]   [facility] [address]  Phone: [phone]  October 4, 2019      Adrian Wagner  [address] [facility] [place] [place] [address]      Dear Adrian,    We care about your health and have reviewed your health plan including your medical conditions, medications, and lab results.  Based on this review, it is recommended that you follow up regarding the following health topic(s):  -Diabetes  -High Blood Pressure  -Wellness (Physical) Visit     We recommend you take the following action(s):  -schedule a WELLNESS (Physical) APPOINTMENT.  We will perform the following labs: Lipids (fasting cholesterol - nothing to eat except water and/or meds for 8-10 hours), BMP (basic metabolic panel) and Microablumin.     Please call us at the [facility] - [phone] (or use [employer]) to address the above recommendations.     Thank you for trusting [facility] and we appreciate the opportunity to serve you.  We look forward to supporting your healthcare needs in the future.    Healthy Regards,    Your Health Care Team  [facility]

## 2019-10-04 NOTE — TELEPHONE ENCOUNTER
LM for patient to return phone call to clinic about message below.  Letter sent.  Idania Brandon CMA ([facility])

## 2019-11-08 ENCOUNTER — TELEPHONE (OUTPATIENT)
Dept: FAMILY MEDICINE | Facility: OTHER | Age: 59
End: 2019-11-08

## 2019-11-08 NOTE — TELEPHONE ENCOUNTER
Panel Management Review    Summary:    Patient is due/failing the following:   BMP, Microalbumin, A1C, COLONOSCOPY, FOLLOW UP, LDL and PHYSICAL    Action needed:   Patient needs office visit for Diabetes/Physical  Patient needs fasting lab only appointment  Patient needs referral/order: Colonoscopy    Type of outreach:    Phone, left message for patient to call back.     Questions for provider review:    None                                                                                                                                    Idania Brandon CMA ([facility])       Chart routed to Care Team .      Patient has the following on his problem list:     Diabetes    ASA:     Last A1C  Lab Results   Component Value Date    A1C 8.1 04/05/2019    A1C 10.5 01/16/2019    A1C 10.2 08/03/2018    A1C 13.8 11/20/2013    A1C 7.6 09/12/2007     A1C tested: FAILED    Last LDL:    Lab Results   Component Value Date    CHOL 185 08/03/2018     Lab Results   Component Value Date    HDL 34 08/03/2018     Lab Results   Component Value Date    LDL 84 08/03/2018     Lab Results   Component Value Date    TRIG 336 08/03/2018     Lab Results   Component Value Date    CHOLHDLRATIO 5.4 09/12/2007     Lab Results   Component Value Date    NHDL 151 08/03/2018       Is the patient on a Statin? YES             Is the patient on Aspirin? YES    Medications     HMG CoA Reductase Inhibitors     rosuvastatin (CRESTOR) 20 MG tablet       Salicylates     ASPIRIN 81 MG OR TABS             Last three blood pressure readings:  BP Readings from Last 3 Encounters:   04/05/19 146/88   03/13/19 (!) 144/100   01/30/19 (!) 138/92       Date of last diabetes office visit: 4/11/19     Tobacco History:     History   Smoking Status     Never Smoker   Smokeless Tobacco     Current User     Types: Snuff, Chew         Hypertension   Last three blood pressure readings:  BP Readings from Last 3 Encounters:   04/05/19 146/88   03/13/19 (!) 144/100   01/30/19 (!)  138/92     Blood pressure: FAILED    HTN Guidelines:  Less than 140/90      Composite cancer screening  Chart review shows that this patient is due/due soon for the following Colonoscopy

## 2019-11-08 NOTE — LETTER
[facility]  [address]   [facility] [address]  Phone: [phone]  November 11, 2019      Adrian Wagner  [address] [place] [id number]      Dear Adrian,    We care about your health and have reviewed your health plan including your medical conditions, medications, and lab results.  Based on this review, it is recommended that you follow up regarding the following health topic(s):  -Cholesterol  -Diabetes  -Colon Cancer Screening  -Wellness (Physical) Visit     We recommend you take the following action(s):  -schedule a WELLNESS (Physical) APPOINTMENT.  We will perform the following labs: A1c, Lipids (fasting cholesterol - nothing to eat except water and/or meds for 8-10 hours), BMP (basic metabolic panel) and Microablumin.  -schedule a COLONOSCOPY to look for colon cancer (due every 10 years or 5 years in higher risk situations.)  Colonoscopies can prevent 90-95% of colon cancer deaths.  Problem lesions can be removed before they ever become cancer.  If you do not wish to do a colonoscopy or cannot afford to do one at this time, there is another option called a Fecal Immunochemical Occult Blood Test (FIT) a take home stool sample kit.  It does not replace the colonoscopy for colorectal cancer screening, but it can detect hidden bleeding in the lower colon.  It does need to be repeated every year and if a positive result is obtained, you would be referred for a colonoscopy.  If you have completed either one of these tests at another facility, please have the records sent to our clinic for our records.     Please call us at the [facility] - [phone] (or use [employer]) to address the above recommendations.     Thank you for trusting [facility] and we appreciate the opportunity to serve you.  We look forward to supporting your healthcare needs in the future.    Healthy Regards,    Your Health Care Team  [facility]

## 2019-12-17 ENCOUNTER — TELEPHONE (OUTPATIENT)
Dept: FAMILY MEDICINE | Facility: OTHER | Age: 59
End: 2019-12-17

## 2019-12-23 NOTE — TELEPHONE ENCOUNTER
Panel Management Review     Summary:     Patient is due/failing the following:   BMP, Microalbumin, A1C, COLONOSCOPY, FOLLOW UP, LDL and PHYSICAL     Action needed:   Patient needs office visit for Diabetes/Physical  Patient needs fasting lab only appointment  Patient needs referral/order: Colonoscopy     Type of outreach:    Phone, left message for patient to call back.      Questions for provider review:    None                                                                        Mariaelena Miguel CMA        Chart routed to Care Team .        Patient has the following on his problem list:      Diabetes     ASA:      Last A1C        Lab Results   Component Value Date     A1C 8.1 04/05/2019     A1C 10.5 01/16/2019     A1C 10.2 08/03/2018     A1C 13.8 11/20/2013     A1C 7.6 09/12/2007      A1C tested: FAILED     Last LDL:          Lab Results   Component Value Date     CHOL 185 08/03/2018            Lab Results   Component Value Date     HDL 34 08/03/2018            Lab Results   Component Value Date     LDL 84 08/03/2018            Lab Results   Component Value Date     TRIG 336 08/03/2018            Lab Results   Component Value Date     CHOLHDLRATIO 5.4 09/12/2007            Lab Results   Component Value Date     NHDL 151 08/03/2018         Is the patient on a Statin? YES             Is the patient on Aspirin? YES         Medications         HMG CoA Reductase Inhibitors      rosuvastatin (CRESTOR) 20 MG tablet        Salicylates      ASPIRIN 81 MG OR TABS                  Last three blood pressure readings:      BP Readings from Last 3 Encounters:   04/05/19 146/88   03/13/19 (!) 144/100   01/30/19 (!) 138/92         Date of last diabetes office visit: 4/11/19                Tobacco History:                     History   Smoking Status     Never Smoker   Smokeless Tobacco     Current User     Types: Snuff, Chew            Hypertension              Last three blood pressure readings:      BP Readings from Last 3  Encounters:   04/05/19 146/88   03/13/19 (!) 144/100   01/30/19 (!) 138/92      Blood pressure: FAILED     HTN Guidelines:  Less than 140/90       Composite cancer screening  Chart review shows that this patient is due/due soon for the following Colonoscopy

## 2020-02-04 ENCOUNTER — TELEPHONE (OUTPATIENT)
Dept: FAMILY MEDICINE | Facility: OTHER | Age: 60
End: 2020-02-04

## 2020-02-04 NOTE — TELEPHONE ENCOUNTER
Panel Management Review      Patient has the following on his problem list:   Summary:    Patient is due/failing the following:   A1C, LDL, BMP, Micro albumin, COLONOSCOPY and PHYSICAL    Action needed:   Patient needs office visit for Physical  Patient needs fasting lab only appointment    Type of outreach:    Phone, left message for patient to call back.     Questions for provider review:    None                                                                                                                                    [facility]       Chart routed to Care Team .  Diabetes    ASA: Passed    Last A1C  Lab Results   Component Value Date    A1C 8.1 04/05/2019    A1C 10.5 01/16/2019    A1C 10.2 08/03/2018    A1C 13.8 11/20/2013    A1C 7.6 09/12/2007     A1C tested: FAILED    Last LDL:    Lab Results   Component Value Date    CHOL 185 08/03/2018     Lab Results   Component Value Date    HDL 34 08/03/2018     Lab Results   Component Value Date    LDL 84 08/03/2018     Lab Results   Component Value Date    TRIG 336 08/03/2018     Lab Results   Component Value Date    CHOLHDLRATIO 5.4 09/12/2007     Lab Results   Component Value Date    NHDL 151 08/03/2018       Is the patient on a Statin? YES             Is the patient on Aspirin? YES    Medications     HMG CoA Reductase Inhibitors     rosuvastatin (CRESTOR) 20 MG tablet       Salicylates     ASPIRIN 81 MG OR TABS             Last three blood pressure readings:  BP Readings from Last 3 Encounters:   04/05/19 146/88   03/13/19 (!) 144/100   01/30/19 (!) 138/92       Date of last diabetes office visit: 04/2019     Tobacco History:     History   Smoking Status     Never Smoker   Smokeless Tobacco     Current User     Types: Snuff, Chew         Hypertension   Last three blood pressure readings:  BP Readings from Last 3 Encounters:   04/05/19 146/88   03/13/19 (!) 144/100   01/30/19 (!) 138/92     Blood pressure: FAILED    HTN Guidelines:  Less than 140/90       Composite cancer screening  Chart review shows that this patient is due/due soon for the following Colonoscopy

## 2020-04-03 ENCOUNTER — TELEPHONE (OUTPATIENT)
Dept: FAMILY MEDICINE | Facility: OTHER | Age: 60
End: 2020-04-03

## 2020-04-03 NOTE — TELEPHONE ENCOUNTER
Panel Management Review    Summary:    Patient is due/failing the following:   BMP, Microalbumin, A1C, COLONOSCOPY, LDL and PHYSICAL    Action needed:   Patient needs office visit for Physical/Diabetes.  Patient needs fasting lab only appointment  Patient needs referral/order: Colonoscopy    Type of outreach:    Will call in June to set up appointment    Questions for provider review:    None                                                                                                                                    Idania Brandon CMA ([facility])       Chart routed to Care Team .      Patient has the following on his problem list:     Diabetes    ASA:     Last A1C  Lab Results   Component Value Date    A1C 8.1 04/05/2019    A1C 10.5 01/16/2019    A1C 10.2 08/03/2018    A1C 13.8 11/20/2013    A1C 7.6 09/12/2007     A1C tested: MONITOR    Last LDL:    Lab Results   Component Value Date    CHOL 185 08/03/2018     Lab Results   Component Value Date    HDL 34 08/03/2018     Lab Results   Component Value Date    LDL 84 08/03/2018     Lab Results   Component Value Date    TRIG 336 08/03/2018     Lab Results   Component Value Date    CHOLHDLRATIO 5.4 09/12/2007     Lab Results   Component Value Date    NHDL 151 08/03/2018       Is the patient on a Statin? YES             Is the patient on Aspirin? YES    Medications     HMG CoA Reductase Inhibitors     rosuvastatin (CRESTOR) 20 MG tablet       Salicylates     ASPIRIN 81 MG OR TABS             Last three blood pressure readings:  BP Readings from Last 3 Encounters:   04/05/19 146/88   03/13/19 (!) 144/100   01/30/19 (!) 138/92       Date of last diabetes office visit: 4/11/19     Tobacco History:     History   Smoking Status     Never Smoker   Smokeless Tobacco     Current User     Types: Snuff, Chew         Hypertension   Last three blood pressure readings:  BP Readings from Last 3 Encounters:   04/05/19 146/88   03/13/19 (!) 144/100   01/30/19 (!) 138/92     Blood  pressure: MONITOR    HTN Guidelines:  Less than 140/90      Composite cancer screening  Chart review shows that this patient is due/due soon for the following Colonoscopy

## 2020-04-03 NOTE — LETTER
[facility]  [address]  [facility] [address]  Phone: [phone]  Martha 15, 2020      Adrian Wagner  [address] [facility] [address]      Dear Adrian,    We care about your health and have reviewed your health plan including your medical conditions, medications, and lab results.  Based on this review, it is recommended that you follow up regarding the following health topic(s):  -Diabetes  -High Blood Pressure  -Colon Cancer Screening  -Wellness (Physical) Visit     We recommend you take the following action(s):  -schedule a FOLLOWUP OFFICE APPOINTMENT.  We will perform the following labs:  A1c, Lipids (fasting cholesterol - nothing to eat except water and/or meds for 8-10 hours), BMP (basic metabolic panel) and Microablumin.  -schedule a WELLNESS (Physical) APPOINTMENT.  We will perform the following labs: A1c, Lipids (fasting cholesterol - nothing to eat except water and/or meds for 8-10 hours), BMP (basic metabolic panel) and Microablumin.  -schedule a COLONOSCOPY to look for colon cancer (due every 10 years or 5 years in higher risk situations.)  Colonoscopies can prevent 90-95% of colon cancer deaths.  Problem lesions can be removed before they ever become cancer.  If you do not wish to do a colonoscopy or cannot afford to do one at this time, there is another option called a Fecal Immunochemical Occult Blood Test (FIT) a take home stool sample kit.  It does not replace the colonoscopy for colorectal cancer screening, but it can detect hidden bleeding in the lower colon.  It does need to be repeated every year and if a positive result is obtained, you would be referred for a colonoscopy.  If you have completed either one of these tests at another facility, please have the records sent to our clinic for our records.     Please call us at the [facility] - [phone] (or use [employer]) to address the above recommendations.     Thank you for trusting  [facility] and we appreciate the opportunity to serve you.  We look forward to supporting your healthcare needs in the future.    Healthy Regards,    Your Health Care Team  [facility]

## 2020-04-14 ENCOUNTER — TELEPHONE (OUTPATIENT)
Dept: FAMILY MEDICINE | Facility: OTHER | Age: 60
End: 2020-04-14

## 2020-04-14 NOTE — LETTER
[facility]  [address]  [facility] [address]  [phone]      April 16, 2020    Adrian Wagner                                                                                                                     [address] [place] [address]        Dear Adrian,    We have attempted to contact you by telephone without success.  At this time, you are due to schedule a follow up appointment to address diabetes.   You can call us at [phone] to schedule a video or telephone appointment, as we are limiting the number of patients that in to the clinic (due to the coronavirus outbreak).      Sincerely,     [facility] Support Staff / [place]

## 2020-04-14 NOTE — TELEPHONE ENCOUNTER
LM for patient to return phone call to clinic.  Patient is due for a diabetic recheck- please help schedule video visit or phone visit.  Idania Brandon CMA ([facility])

## 2020-06-12 NOTE — TELEPHONE ENCOUNTER
LM for patient to see if we could help her get scheduled for a virtual/telephone visit for Diabetes and curbside Labs.  Idania Brandon CMA ([facility])

## 2020-06-15 NOTE — TELEPHONE ENCOUNTER
Left message for patient to return call to clinic. When call is returned please assist in scheduling labs and virtual DM visit.     Ramsey Sears,

## 2020-07-08 ENCOUNTER — TELEPHONE (OUTPATIENT)
Dept: FAMILY MEDICINE | Facility: OTHER | Age: 60
End: 2020-07-08

## 2020-07-08 NOTE — LETTER
[facility]  [address]  [facility] [address]  Phone: [phone]  July 30, 2020      Adrian Wagner  [address] [place] [address]      Dear Adrian,    We care about your health and have reviewed your health plan including your medical conditions, medications, and lab results.  Based on this review, it is recommended that you follow up regarding the following health topic(s):  -Diabetes  -Colon Cancer Screening  -Wellness (Physical) Visit     We recommend you take the following action(s):  -schedule a WELLNESS (Physical) APPOINTMENT.  We will perform the following labs: A1c, Lipids (fasting cholesterol - nothing to eat except water and/or meds for 8-10 hours) and Microablumin.  -schedule a COLONOSCOPY to look for colon cancer (due every 10 years or 5 years in higher risk situations.)  Colonoscopies can prevent 90-95% of colon cancer deaths.  Problem lesions can be removed before they ever become cancer.  If you do not wish to do a colonoscopy or cannot afford to do one at this time, there is another option called a Fecal Immunochemical Occult Blood Test (FIT) a take home stool sample kit.  It does not replace the colonoscopy for colorectal cancer screening, but it can detect hidden bleeding in the lower colon.  It does need to be repeated every year and if a positive result is obtained, you would be referred for a colonoscopy.  If you have completed either one of these tests at another facility, please have the records sent to our clinic for our records.     Please call us at the [facility] - [phone] (or use [employer]) to address the above recommendations.     Thank you for trusting [facility] and we appreciate the opportunity to serve you.  We look forward to supporting your healthcare needs in the future.    Healthy Regards,    Your Health Care Team  [facility]

## 2020-07-08 NOTE — TELEPHONE ENCOUNTER
Summary:    Patient is due/failing the following:   Diabetic follow up, Eye exam, LDL, Microalbumin, BMP, A1C, Colon Cancer Screening and PHYSICAL    Action needed:   Patient needs office visit for Physical ., Patient needs fasting lab only appointment and schedule a colonoscopy or complete a FIT test    Type of outreach:    Phone, left message for patient to call back.     Questions for provider review:    None                                                                                                                                    Nadia Beatty CMA       Chart routed to Care Team .          Panel Management Review      Patient has the following on his problem list:     Diabetes    ASA: Passed    Last A1C  Lab Results   Component Value Date    A1C 8.1 04/05/2019    A1C 10.5 01/16/2019    A1C 10.2 08/03/2018    A1C 13.8 11/20/2013    A1C 7.6 09/12/2007     A1C tested: FAILED    Last LDL:    Lab Results   Component Value Date    CHOL 185 08/03/2018     Lab Results   Component Value Date    HDL 34 08/03/2018     Lab Results   Component Value Date    LDL 84 08/03/2018     Lab Results   Component Value Date    TRIG 336 08/03/2018     Lab Results   Component Value Date    CHOLHDLRATIO 5.4 09/12/2007     Lab Results   Component Value Date    NHDL 151 08/03/2018       Is the patient on a Statin? YES             Is the patient on Aspirin? YES    Medications     HMG CoA Reductase Inhibitors     rosuvastatin (CRESTOR) 20 MG tablet       Salicylates     ASPIRIN 81 MG OR TABS             Last three blood pressure readings:  BP Readings from Last 3 Encounters:   04/05/19 146/88   03/13/19 (!) 144/100   01/30/19 (!) 138/92          Tobacco History:     History   Smoking Status     Never Smoker   Smokeless Tobacco     Current User     Types: Snuff, Chew         Hypertension   Last three blood pressure readings:  BP Readings from Last 3 Encounters:   04/05/19 146/88   03/13/19 (!) 144/100   01/30/19 (!) 138/92     Blood  pressure: FAILED    HTN Guidelines:  Less than 140/90      Composite cancer screening  Chart review shows that this patient is due/due soon for the following Colonoscopy

## 2020-09-21 ENCOUNTER — TELEPHONE (OUTPATIENT)
Dept: FAMILY MEDICINE | Facility: OTHER | Age: 60
End: 2020-09-21

## 2020-09-21 NOTE — LETTER
[facility]  [address]  [place] [address]  Phone: [phone]  September 23, 2020      Adrian Wagner  [address] [facility] [address]      Dear Adrian,    We care about your health and have reviewed your health plan including your medical conditions, medications, and lab results.  Based on this review, it is recommended that you follow up regarding the following health topic(s):  -Diabetes  -Colon Cancer Screening  -Wellness (Physical) Visit     We recommend you take the following action(s):  -schedule a FOLLOWUP OFFICE APPOINTMENT.  We will perform the following labs:  A1c, Lipids (fasting cholesterol - nothing to eat except water and/or meds for 8-10 hours) and BMP (basic metabolic panel).  -schedule a COLONOSCOPY to look for colon cancer (due every 10 years or 5 years in higher risk situations.)  Colonoscopies can prevent 90-95% of colon cancer deaths.  Problem lesions can be removed before they ever become cancer.  If you do not wish to do a colonoscopy or cannot afford to do one at this time, there is another option called a Fecal Immunochemical Occult Blood Test (FIT) a take home stool sample kit.  It does not replace the colonoscopy for colorectal cancer screening, but it can detect hidden bleeding in the lower colon.  It does need to be repeated every year and if a positive result is obtained, you would be referred for a colonoscopy.  If you have completed either one of these tests at another facility, please have the records sent to our clinic for our records.     Please call us at the [facility] - [phone] (or use [employer]) to address the above recommendations.     Thank you for trusting [facility] and we appreciate the opportunity to serve you.  We look forward to supporting your healthcare needs in the future.    Healthy Regards,    Your Health Care Team  [facility]

## 2020-09-21 NOTE — TELEPHONE ENCOUNTER
Summary:    Patient is due/failing the following:   Diabetic follow up, LDL, Microalbumin, BMP, A1C, colon cancer screening and PHYSICAL    Action needed:   Patient needs office visit for Physical. and Patient needs fasting lab only appointment    Type of outreach:    Phone, left message for patient to call back.     Questions for provider review:    None                                                                                                                                    Nadia Beatty CMA       Chart routed to Care Team .          Panel Management Review      Patient has the following on his problem list:     Diabetes    ASA: Passed    Last A1C  Lab Results   Component Value Date    A1C 8.1 04/05/2019    A1C 10.5 01/16/2019    A1C 10.2 08/03/2018    A1C 13.8 11/20/2013    A1C 7.6 09/12/2007     A1C tested: FAILED    Last LDL:    Lab Results   Component Value Date    CHOL 185 08/03/2018     Lab Results   Component Value Date    HDL 34 08/03/2018     Lab Results   Component Value Date    LDL 84 08/03/2018     Lab Results   Component Value Date    TRIG 336 08/03/2018     Lab Results   Component Value Date    CHOLHDLRATIO 5.4 09/12/2007     Lab Results   Component Value Date    NHDL 151 08/03/2018       Is the patient on a Statin? YES             Is the patient on Aspirin? YES    Medications     HMG CoA Reductase Inhibitors     rosuvastatin (CRESTOR) 20 MG tablet       Salicylates     ASPIRIN 81 MG OR TABS             Last three blood pressure readings:  BP Readings from Last 3 Encounters:   04/05/19 146/88   03/13/19 (!) 144/100   01/30/19 (!) 138/92            Tobacco History:     History   Smoking Status     Never Smoker   Smokeless Tobacco     Current User     Types: Snuff, Chew         Hypertension   Last three blood pressure readings:  BP Readings from Last 3 Encounters:   04/05/19 146/88   03/13/19 (!) 144/100   01/30/19 (!) 138/92     Blood pressure: FAILED    HTN Guidelines:  Less than 140/90       Composite cancer screening  Chart review shows that this patient is due/due soon for the following Colonoscopy

## 2020-12-01 ENCOUNTER — TELEPHONE (OUTPATIENT)
Dept: FAMILY MEDICINE | Facility: OTHER | Age: 60
End: 2020-12-01

## 2020-12-01 NOTE — LETTER
[facility]  [address]  [facility] [address]  Phone: [phone]  December 22, 2020      Adrian Wagner  [address] [place] [id number]      Dear Adrian,    We care about your health and have reviewed your health plan including your medical conditions, medications, and lab results.  Based on this review, it is recommended that you follow up regarding the following health topic(s):  -Wellness (Physical) Visit     We recommend you take the following action(s):  -schedule a WELLNESS (Physical) APPOINTMENT.  We will perform the following labs: A1c, Lipids (fasting cholesterol - nothing to eat except water and/or meds for 8-10 hours), BMP (basic metabolic panel) and Microablumin.     Please call us at the [facility] - [phone] (or use [employer]) to address the above recommendations.     Thank you for trusting [facility] and we appreciate the opportunity to serve you.  We look forward to supporting your healthcare needs in the future.    Healthy Regards,    Your Health Care Team  [facility]

## 2020-12-01 NOTE — TELEPHONE ENCOUNTER
Summary:    Patient is due/failing the following:   BMP,Microalbumin, A1C, LDL and PHYSICAL    Action needed:   Patient needs office visit for Physical . and Patient needs fasting lab only appointment    Type of outreach:    Phone, left message for patient to call back.     Questions for provider review:    None                                                                                                                                    Nadia Beatty CMA      Chart routed to Care Team .

## 2023-02-07 NOTE — LETTER
[address]  [facility] [address]  [phone]        January 23, 2019    Adrian Wagner  [address] [place] [address]          Dear Adrian,    Your prescription for Basaglar Kwikpen has been sent to your pharmacy. Please stop in the clinic or pharmacy in the next week for a blood pressure check    Sincerely,        Neha Umanzor MD                   Avoid any painful activity or exercise.  Wear your splint during the day as needed; may remove for sleeping and careful bathing. After 2 weeks, may stop the splint altogether.  Over the counter Tylenol as needed for pain- you can take 1,000 mg Tylenol every 8 hours as needed.  Eat foods rich in Calcium and Vit D.  You can take a multivitamin and 2,000 u Vit D daily.  Follow up as needed or if your pain doesn't improve as expected over the next 4 weeks.

## 2023-08-10 NOTE — LETTER
[address]  [facility] [address]  Phone: [phone]       December 26, 2019         Adrian Wagner  [address] [facility] [address]            Dear Adrian:    We are concerned about your health care. We have been trying to reach via telephone. We are just sending a reainmger     Thank you,      {PROVIDERS ALL CLINICS:[id number]}/ ***     Detail Level: Detailed

## 2024-02-18 NOTE — TELEPHONE ENCOUNTER
[facility] is requesting new Rx for patient on Accu-Chek Avia Meter & Lancets, they have the test strips.  
Prescription approved per [facility].  Astrid Sanchez RN, BSN   
No